# Patient Record
Sex: FEMALE | Race: BLACK OR AFRICAN AMERICAN
[De-identification: names, ages, dates, MRNs, and addresses within clinical notes are randomized per-mention and may not be internally consistent; named-entity substitution may affect disease eponyms.]

---

## 2017-04-10 ENCOUNTER — HOSPITAL ENCOUNTER (OUTPATIENT)
Dept: HOSPITAL 56 - MW.US | Age: 27
End: 2017-04-10
Attending: ADVANCED PRACTICE MIDWIFE
Payer: COMMERCIAL

## 2017-04-10 DIAGNOSIS — Z34.93: Primary | ICD-10-CM

## 2017-04-10 DIAGNOSIS — Z3A.34: ICD-10-CM

## 2017-04-11 NOTE — US
EXAM DATE: 04/10/17



PATIENT'S AGE: 26





Patient: MAGDA ABBOTT



Facility: [place], [place]

Patient ID: [id number]

Site Patient ID: [id number].

Site Accession #: [id number]

: 1990

Study: US OB Pelvis [id number]/10/2017 10:34:38 AM

Ordering Physician: Tamiko Alarcon



Final Report: 

INDICATION:

Check placenta, position and estimated fetal weight.



TECHNIQUE:

Limited transabdominal and transvaginal two-dimensional grayscale ultrasound 
examination.



COMPARISON:

2016.



FINDINGS:

There is a living fetus vertex lie with gestational age of 34 weeks 6 days by 
LMP and 36 weeks 2 days by today`s measurements. EDC based on LMP is 2017.



BPD: 9.1 cm, 36 weeks 6 days

Head circumference: 32.3 cm, 36 weeks 4 days

Abdominal circumference: 30.3 cm, 34 weeks 2 days

Femur length: 7.3 cm, 37 weeks 2 days 



The fetal weight is estimated at 2707 grams, the 67th percentile.



The fetal heart rate is measured at 142 beats per minute and the rhythm appears 
regular. 



The amniotic fluid volume is within normal limits with PAULO of 12 cm.



The placenta is anterior and superior to the cervical os. There is no evidence 
of previa. 



The cervical length is normal at 3.0 cm in the internal os is closed.



IMPRESSION:

1. Living fetus in vertex lie with gestational age of 34 weeks 6 days by LMP 
and 36 weeks 2 days by today`s measurements. EDC based on LMP is 2017.

2. Fetal weight estimated at 2707 grams, the 67th percentile.



Dictated by Dennis Kee MD @ Apr 10 2017 4:32PM

(Electronic Signature)



Report Signed by Proxy and Original Signed Document filed in the

Medical Record.
CATHYD

## 2017-04-19 ENCOUNTER — HOSPITAL ENCOUNTER (OUTPATIENT)
Dept: HOSPITAL 56 - MW.CHOBGYN | Age: 27
Discharge: HOME | End: 2017-04-19
Attending: ADVANCED PRACTICE MIDWIFE
Payer: COMMERCIAL

## 2017-04-19 DIAGNOSIS — Z34.90: Primary | ICD-10-CM

## 2017-05-06 ENCOUNTER — HOSPITAL ENCOUNTER (OUTPATIENT)
Dept: HOSPITAL 56 - MW.OBCHECK | Age: 27
Discharge: HOME | End: 2017-05-06
Attending: OBSTETRICS & GYNECOLOGY
Payer: COMMERCIAL

## 2017-05-06 DIAGNOSIS — Z3A.26: ICD-10-CM

## 2017-05-06 DIAGNOSIS — O36.8121: Primary | ICD-10-CM

## 2017-05-08 ENCOUNTER — HOSPITAL ENCOUNTER (INPATIENT)
Dept: HOSPITAL 56 - MW.OBCHECK | Age: 27
LOS: 1 days | Discharge: HOME | DRG: 560 | End: 2017-05-09
Attending: OBSTETRICS & GYNECOLOGY | Admitting: OBSTETRICS & GYNECOLOGY
Payer: COMMERCIAL

## 2017-05-08 DIAGNOSIS — Z3A.38: ICD-10-CM

## 2017-05-08 PROCEDURE — 0KQM0ZZ REPAIR PERINEUM MUSCLE, OPEN APPROACH: ICD-10-PCS | Performed by: OBSTETRICS & GYNECOLOGY

## 2017-05-08 NOTE — PCM.LDHP
L&D History of Present Illness





- General


Date of Service: 17


Admit Problem/Dx: 


 Patient Status Order with Admit Dx/Problem





17 10:25


Patient Status [ADT] Routine 








 Admission Diagnosis/Problem











Admission Diagnosis/Problem    Pregnant - planned














17 10:31


27 yo  EDC 2017 38 wks today. A+, RI, GBS neg. Active labor 6-7cm. 

Intact. Hx in pregnancy; anemia.


Source of Information: Patient


History Limitations: Reports: No limitations





- History of Present Illness


Improves with: Reports: None


Worsens with: Reports: None


Associated Symptoms: Reports: N





- Related Data


Allergies/Adverse Reactions: 


 Allergies











Allergy/AdvReac Type Severity Reaction Status Date / Time


 


No Known Allergies Allergy   Verified 04/23/15 08:19














H&P Review of Systems





- Review of Systems:


Review Of Systems: See Below


General: Reports: no symptoms


HEENT: Reports: no symptoms


Pulmonary: Reports: No Symptoms


Cardiovascular: Reports: no symptoms


Gastrointestinal: Reports: No symptoms


Genitourinary: Reports: no symptoms


Musculoskeletal: Reports: no symptoms


Skin: Reports: no symptoms


Psychiatric: Reports: no symptoms


Neurological: Reports: No Symptoms


Hematologic/Lymphatic: Reports: no symptoms


Immunologic: Reports: no symptoms





L&D Exam





- Exam


Exam: See Below





- Vital Signs


Weight: 70.307 kg





- OB Specific


Contraction Intensity: Strong


Fetal movement: active


Fetal heart tones: present


Fetal heart tones per min: 120


Fetal Heart Rate (FHR) Variability: Moderate (6-25 bmp)


Birth Presentation: Vertex





- Garcia Score


Garcia Score Cervix Position: Anterior


Garcia Score Consistency: Soft


Garcia Score Effacement: >80%


Garcia Score Dilation: > 5 cm


Garcia Score Infant's Station: -1 ,0


Garcia Score Total: 12





- Exam


General: alert, oriented, cooperative


HEENT: Hearing intact


Lungs: Normal respiratory effort


Abdomen: soft (gravid)


Rectal Exam: Deferred


Back Exam: full range of motion


Extremities: normal inspection


Skin: warm, dry, intact


Neurological: cranial nerves intact


Psychiatric: alert, normal affect, normal mood





- Problem List


(1) Supervision of normal IUP (intrauterine pregnancy) in multigravida


SNOMED Code(s): 715700494, 152297498, 972292505


   ICD Code: Z34.80 - ENCOUNTER FOR SUPRVSN OF NORMAL PREGNANCY, UNSP TRIMESTER

   Status: Acute   Current Visit: Yes   


Qualifiers: 


   Trimester: third trimester   Qualified Code(s): Z34.83 - Encounter for 

supervision of other normal pregnancy, third trimester   


Problem List Initiated/Reviewed/Updated: Yes


Orders Last 24hrs: 


 Active Orders 24 hr











 Category Date Time Status


 


 Patient Status [ADT] Routine ADT  17 10:25 Active


 


 Fetal Heart Tones [RC] CONTINUOUS Care  17 10:25 Active


 


 Fetal Non Stress Test [RC] PER UNIT ROUTINE Care  17 10:25 Active


 


 May Shower [RC] ASDIRECTED Care  17 10:25 Active


 


 Notify Provider [RC] PRN Care  17 10:25 Active


 


 Up ad Mary [RC] ASDIRECTED Care  17 10:25 Active


 


 Vaginal Exam [RC] PRN Care  17 10:25 Active


 


 Vital Signs [RC] PER UNIT ROUTINE Care  17 10:25 Active


 


 CBC W/O DIFF,HEMOGRAM [HEME] Routine Lab  17 10:25 Ordered


 


 TYPE AND SCREEN [BBK] Routine Lab  17 10:25 Ordered


 


 Butorphanol [Stadol] Med  17 10:25 Active





 1 mg IVPUSH Q1H PRN   


 


 Carboprost Tromethamine [Hemabate DS] Med  17 10:25 Active





 250 mcg IM ASDIRECTED PRN   


 


 Lactated Ringers [Ringers, Lactated] 1,000 ml Med  17 10:30 Active





 IV ASDIRECTED   


 


 Lidocaine 1% [Xylocaine 1%] Med  17 10:25 Active





 50 ml INJECT .ONCE PRN   


 


 Methylergonovine [Methergine] Med  17 10:25 Active





 0.2 mg IM ASDIRECTED PRN   


 


 Misoprostol [Cytotec] Med  17 10:25 Active





 200 mcg PO .ONCE PRN   


 


 Nalbuphine [Nubain] Med  17 10:25 Active





 10 mg IVPUSH Q1H PRN   


 


 Oxytocin/Lactated Ringers [Pitocin in LR 30 Units/500 Med  17 10:30 

Active





 ML]   





 30 unit in 500 ml IV TITRATE   


 


 Sodium Chloride 0.9% [Saline Flush] Med  17 10:25 Active





 10 ml FLUSH ASDIRECTED PRN   


 


 Sodium Chloride 0.9% [Saline Flush] Med  17 10:25 Active





 2.5 ml FLUSH ASDIRECTED PRN   


 


 Water For Irrigation,Sterile [Sterile Water for Med  17 10:25 Active





 Irrigation]   





 1,000 ml IRR ASDIRECTED PRN   


 


 Fetal Scalp Electrode [WOMSER] Per Unit Routine Oth  17 10:25 Ordered


 


 Peripheral IV Insertion Adult [OM.PC] Routine Oth  17 10:25 Ordered


 


 Resuscitation Status Routine Resus Stat  17 10:25 Ordered








 Medication Orders





Butorphanol Tartrate (Stadol)  1 mg IVPUSH Q1H PRN


   PRN Reason: Pain


Carboprost Tromethamine (Hemabate Ds)  250 mcg IM ASDIRECTED PRN


   PRN Reason: Post Partum Hemorrhage


Lactated Ringer's (Ringers, Lactated)  1,000 mls @ 150 mls/hr IV ASDIRECTED UZMA


Oxytocin/Lactated Ringer's (Pitocin In Lr 30 Units/500 Ml)  30 unit in 500 mls 

@ 999 mls/hr IV TITRATE UZMA


   Stop: 17 11:01


Lidocaine HCl (Xylocaine 1%)  50 ml INJECT .ONCE PRN


   PRN Reason: Laceration repair


Methylergonovine Maleate (Methergine)  0.2 mg IM ASDIRECTED PRN


   PRN Reason: Post Partum Hemorrhage


Misoprostol (Cytotec)  200 mcg PO .ONCE PRN


   PRN Reason: Post Partum Hemorrhage


Nalbuphine HCl (Nubain)  10 mg IVPUSH Q1H PRN


   PRN Reason: Pain (severe 7-10)


   Stop: 17 12:26


Sodium Chloride (Saline Flush)  10 ml FLUSH ASDIRECTED PRN


   PRN Reason: Keep Vein Open


Sodium Chloride (Saline Flush)  2.5 ml FLUSH ASDIRECTED PRN


   PRN Reason: Keep Vein Open


Sterile Water (Sterile Water For Irrigation)  1,000 ml IRR ASDIRECTED PRN


   PRN Reason: delivery








Assessment/Plan Comment:: 





Labor


A: 27 yo  EDC 2017 38 wks today. A+, RI, GBS neg. Active labor 6-

7cm. Intact. Hx in pregnancy; anemia.


P: Admit to L&D, epidural prn. Anticipate

## 2017-05-08 NOTE — PCM.PREANE
Preanesthetic Assessment





- Anesthesia/Transfusion/Family Hx


Anesthesia History: Prior Anesthesia Without Reaction





- Review of Systems


General: No Symptoms


Pulmonary: No Symptoms


Cardiovascular: No Symptoms


Gastrointestinal: No symptoms


Neurological: No Symptoms


Other: Reports: None





- Physical Assessment


Height: 5 ft 6 in


Weight: 70.307 kg


ASA Class: 2


Mental Status: Alert & Oriented x3


Airway Class: Mallampati = 2


Dentition: Reports: Normal Dentition


Thyro-Mental Finger Breadths: 3


Mouth Opening Finger Breadths: 3


ROM/Head Extension: Full


Lungs: Clear to auscultation, Normal respiratory effort


Cardiovascular: Regular Rate, Regular Rhythm





- Lab


Values: 





 Laboratory Last Values











WBC  8.68 K/uL (4.0-11.0)   17  10:37    


 


RBC  4.49 M/uL (4.30-5.90)   17  10:37    


 


Hgb  12.1 g/dL (12.0-16.0)   17  10:37    


 


Hct  36.3 % (36.0-46.0)   17  10:37    


 


MCV  80.8 fL (80.0-98.0)   17  10:37    


 


MCH  26.9 pg (27.0-32.0)  L  17  10:37    


 


MCHC  33.3 g/dL (31.0-37.0)   17  10:37    


 


RDW Std Deviation  62.3 fl (28.0-62.0)  H  17  10:37    


 


RDW Coeff of Guille  21 % (11.0-15.0)  H  17  10:37    


 


Plt Count  168 K/uL (150-400)   17  10:37    


 


MPV  10.60 fL (7.40-12.00)   17  10:37    


 


Nucleated RBC %  0.0 /100WBC  17  10:37    


 


Nucleated RBCs #  0 K/uL  17  10:37    














- Allergies


Allergies/Adverse Reactions: 


 Allergies











Allergy/AdvReac Type Severity Reaction Status Date / Time


 


No Known Allergies Allergy   Verified 04/23/15 08:19














- Acknowledgements


Anesthesia Type Planned: Epidural


Pt an Appropriate Candidate for the Planned Anesthesia: Yes


Alternatives and Risks of Anesthesia Discussed w Pt/Guardian: Yes


Pt/Guardian Understands and Agrees with Anesthesia Plan: Yes





PreAnesthesia Questionnaire


HEENT History: Reports: None


Cardiovascular History: Reports: None


Respiratory History: Reports: None


Gastrointestinal History: Reports: GERD


Genitourinary History: Reports: None


OB/GYN History: Reports: Pregnancy


: 2


Para: 1


LMP (Approximate): Pregnant


Musculoskeletal History: Reports: None


Neurological History: Reports: None


Psychiatric History: Reports: None


Endocrine/Metabolic History: Reports: None


Hematologic History: Reports: Anemia


Immunologic History: Reports: None


Oncologic (Cancer) History: Reports: None


Dermatologic History: Reports: None





- Infectious Disease History


Infectious Disease History: Reports: None





- CURRENT (IN HOUSE) MEDS


Current Meds: 





 Current Medications





Butorphanol Tartrate (Stadol)  1 mg IVPUSH Q1H PRN


   PRN Reason: Pain


Carboprost Tromethamine (Hemabate Ds)  250 mcg IM ASDIRECTED PRN


   PRN Reason: Post Partum Hemorrhage


Lactated Ringer's (Ringers, Lactated)  1,000 mls @ 150 mls/hr IV ASDIRECTED UZMA


Lidocaine HCl (Xylocaine 1%)  50 ml INJECT .ONCE PRN


   PRN Reason: Laceration repair


Methylergonovine Maleate (Methergine)  0.2 mg IM ASDIRECTED PRN


   PRN Reason: Post Partum Hemorrhage


Misoprostol (Cytotec)  200 mcg PO .ONCE PRN


   PRN Reason: Post Partum Hemorrhage


Nalbuphine HCl (Nubain)  10 mg IVPUSH Q1H PRN


   PRN Reason: Pain (severe 7-10)


   Stop: 17 12:26


Sodium Chloride (Saline Flush)  10 ml FLUSH ASDIRECTED PRN


   PRN Reason: Keep Vein Open


Sodium Chloride (Saline Flush)  2.5 ml FLUSH ASDIRECTED PRN


   PRN Reason: Keep Vein Open


Sterile Water (Sterile Water For Irrigation)  1,000 ml IRR ASDIRECTED PRN


   PRN Reason: delivery





Discontinued Medications





Oxytocin/Lactated Ringer's (Pitocin In Lr 30 Units/500 Ml)  30 unit in 500 mls 

@ 999 mls/hr IV TITRATE UZMA


   Stop: 17 11:01

## 2017-05-09 VITALS — SYSTOLIC BLOOD PRESSURE: 116 MMHG | DIASTOLIC BLOOD PRESSURE: 70 MMHG

## 2017-05-09 NOTE — PCM.DCSUM1
**Discharge Summary





- Hospital Course


Free Text/Narrative:: 





Discharge home with infant girl. Follow up 6 weeks or sooner if needed.





- Discharge Data


Discharge Date: 05/09/17


Discharge Disposition: Home, Self-Care 01


Condition: Good





- Discharge Diagnosis/Problem(s)


(1) Supervision of normal IUP (intrauterine pregnancy) in multigravida


SNOMED Code(s): 998740257, 456522446, 116171542


   ICD Code: Z34.80 - ENCOUNTER FOR SUPRVSN OF NORMAL PREGNANCY, UNSP TRIMESTER

   Status: Acute   Current Visit: Yes   


Qualifiers: 


   Trimester: third trimester   Qualified Code(s): Z34.83 - Encounter for 

supervision of other normal pregnancy, third trimester   





- Patient Instructions


Diet: Usual Diet as Tolerated


Activity: As Tolerated, Rest and Relax Today


Driving: Do Not Drive (for a few days)


Showering/Bathing: May Shower


Notify Provider of: Fever, Increased Pain, Swelling and Redness, Nausea and/or 

Vomiting





- Discharge Plan


Referrals: 


[facility] [Outside]


Agnes Morrison CNM [Mid-Wife] - 06/19/17 9:30 am





- Discharge Summary/Plan Comment


Discharge Summary/Plan Comment: 





Discharge home with infant girl. Follow up 6 weeks or sooner if needed.





- General Info


Date of Service: 05/09/17


Functional Status: Reports: pain controlled, tolerating diet, ambulating, 

urinating





- Review of Systems


General: Reports: No Symptoms


HEENT: Reports: no symptoms


Pulmonary: Reports: no symptoms


Cardiovascular: Reports: No Symptoms


Gastrointestinal: Reports: No symptoms


Genitourinary: Reports: no symptoms


Musculoskeletal: Reports: no symptoms


Skin: Reports: no symptoms


Neurological: Reports: No Symptoms


Psychiatric: Reports: no symptoms





- Patient Data


Vitals - Most Recent: 


 Last Vital Signs











Temp  36.8 C   05/09/17 04:00


 


Pulse  70   05/09/17 04:00


 


Resp  12   05/09/17 04:00


 


BP  115/63   05/09/17 04:00


 


Pulse Ox  98   05/09/17 04:00











Weight - Most Recent: 83.915 kg


I&O - Last 24 hours: 


 Intake & Output











 05/08/17 05/09/17 05/09/17





 22:59 06:59 14:59


 


Intake Total 500  


 


Balance 500  











Lab Results - Last 24 hrs: 


 Laboratory Results - last 24 hr











  05/08/17 05/08/17 Range/Units





  10:37 10:37 


 


WBC  8.68   (4.0-11.0)  K/uL


 


RBC  4.49   (4.30-5.90)  M/uL


 


Hgb  12.1   (12.0-16.0)  g/dL


 


Hct  36.3   (36.0-46.0)  %


 


MCV  80.8   (80.0-98.0)  fL


 


MCH  26.9 L   (27.0-32.0)  pg


 


MCHC  33.3   (31.0-37.0)  g/dL


 


RDW Std Deviation  62.3 H   (28.0-62.0)  fl


 


RDW Coeff of Guille  21 H   (11.0-15.0)  %


 


Plt Count  168   (150-400)  K/uL


 


MPV  10.60   (7.40-12.00)  fL


 


Nucleated RBC %  0.0   /100WBC


 


Nucleated RBCs #  0   K/uL


 


Blood Type   A POSITIVE  


 


Antibody Screen   NEGATIVE  











Med Orders - Current: 


 Current Medications





Acetaminophen (Tylenol Extra Strength)  500 mg PO Q4H PRN


   PRN Reason: Pain


Acetaminophen (Tylenol Extra Strength)  1,000 mg PO Q4H PRN


   PRN Reason: Pain


   Last Admin: 05/09/17 02:35 Dose:  1,000 mg


Benzocaine/Menthol (Dermoplast Pain Relief 20%-0.5% Spray)  78 gm TOP 

ASDIRECTED PRN


   PRN Reason: Perineal Comfort Measure


   Last Admin: 05/08/17 20:36 Dose:  1 canister


Bisacodyl (Dulcolax)  10 mg RECTAL .ONCE PRN


   PRN Reason: Constipation


Docusate Sodium (Colace)  100 mg PO BID PRN


   PRN Reason: Constipation


Emollient Ointment (Lansinoh Hpa)  0 gm TOP ASDIRECTED PRN


   PRN Reason: Sore Nipples


   Last Admin: 05/08/17 20:35 Dose:  1 tube


Ibuprofen (Motrin)  400 mg PO Q4H PRN


   PRN Reason: Pain


Ibuprofen (Motrin)  800 mg PO Q6H PRN


   PRN Reason: Pain


   Last Admin: 05/08/17 20:32 Dose:  800 mg


Oxycodone HCl (Oxycodone)  5 mg PO Q2H PRN


   PRN Reason: Pain


   Last Admin: 05/08/17 23:06 Dose:  5 mg


Witch Hazel (Tucks)  1 pad TOP ASDIRECTED PRN


   PRN Reason: comfort care


   Last Admin: 05/08/17 20:36 Dose:  1 tub





Discontinued Medications





Butorphanol Tartrate (Stadol)  1 mg IVPUSH Q1H PRN


   PRN Reason: Pain


Carboprost Tromethamine (Hemabate Ds)  250 mcg IM ASDIRECTED PRN


   PRN Reason: Post Partum Hemorrhage


Fentanyl (Sublimaze) Confirm Administered Dose 100 mcg .ROUTE .STK-MED ONE


   Stop: 05/08/17 11:08


Lactated Ringer's (Ringers, Lactated)  1,000 mls @ 150 mls/hr IV ASDIRECTED [facility]


   Last Admin: 05/08/17 12:50 Dose:  999 mls/hr


Oxytocin/Lactated Ringer's (Pitocin In Lr 30 Units/500 Ml)  30 unit in 500 mls 

@ 999 mls/hr IV TITRATE [facility]


   Stop: 05/08/17 11:01


   Last Admin: 05/08/17 14:47 Dose:  999 mls/hr


Ropivacaine (Naropin 0.2%) Confirm Administered Dose 100 mls @ as directed 

.ROUTE .STK-MED ONE


   Stop: 05/08/17 11:08


Lidocaine HCl (Xylocaine 1%)  50 ml INJECT .ONCE PRN


   PRN Reason: Laceration repair


Methylergonovine Maleate (Methergine)  0.2 mg IM ASDIRECTED PRN


   PRN Reason: Post Partum Hemorrhage


Misoprostol (Cytotec)  200 mcg PO .ONCE PRN


   PRN Reason: Post Partum Hemorrhage


Nalbuphine HCl (Nubain)  10 mg IVPUSH Q1H PRN


   PRN Reason: Pain (severe 7-10)


   Stop: 05/08/17 12:26


Sodium Chloride (Saline Flush)  10 ml FLUSH ASDIRECTED PRN


   PRN Reason: Keep Vein Open


Sodium Chloride (Saline Flush)  2.5 ml FLUSH ASDIRECTED PRN


   PRN Reason: Keep Vein Open


Sterile Water (Sterile Water For Irrigation)  1,000 ml IRR ASDIRECTED PRN


   PRN Reason: delivery











- Exam


General: Reports: alert, oriented, cooperative, no acute distress


Lungs: Reports: Normal respiratory effort


Abdomen: Reports: soft, no tenderness, no distension


 (Female) Exam: Vaginal bleeding


Rectal (Female) Exam: Deferred


Back Exam: Reports: full range of motion


Extremities: Reports: no edema, normal pulses


Skin: Reports: warm, dry, intact


Wound/Incisions: Reports: healing well


Neurological: Reports: no new focal deficit


Psy/Mental Status: Reports: alert, normal affect, normal mood





*Q Meaningful Use (DIS)





- VTE *Q


VTE Criteria *Q: 








- Stroke *Q


Stroke Criteria *Q: 








- AMI *Q


AMI Criteria *Q:

## 2019-04-03 LAB
CHLORIDE SERPL-SCNC: 103 MMOL/L (ref 98–107)
SODIUM SERPL-SCNC: 139 MMOL/L (ref 136–145)

## 2019-04-04 ENCOUNTER — HOSPITAL ENCOUNTER (OUTPATIENT)
Dept: HOSPITAL 56 - MW.SDS | Age: 29
Discharge: HOME | End: 2019-04-04
Attending: OBSTETRICS & GYNECOLOGY
Payer: COMMERCIAL

## 2019-04-04 VITALS — SYSTOLIC BLOOD PRESSURE: 118 MMHG | DIASTOLIC BLOOD PRESSURE: 85 MMHG

## 2019-04-04 DIAGNOSIS — Z79.899: ICD-10-CM

## 2019-04-04 DIAGNOSIS — N72: ICD-10-CM

## 2019-04-04 DIAGNOSIS — N87.1: Primary | ICD-10-CM

## 2019-04-04 PROCEDURE — 57522 CONIZATION OF CERVIX: CPT

## 2019-04-04 PROCEDURE — 84703 CHORIONIC GONADOTROPIN ASSAY: CPT

## 2019-04-04 PROCEDURE — 86901 BLOOD TYPING SEROLOGIC RH(D): CPT

## 2019-04-04 PROCEDURE — 86850 RBC ANTIBODY SCREEN: CPT

## 2019-04-04 PROCEDURE — 85027 COMPLETE CBC AUTOMATED: CPT

## 2019-04-04 PROCEDURE — 36415 COLL VENOUS BLD VENIPUNCTURE: CPT

## 2019-04-04 PROCEDURE — 86900 BLOOD TYPING SEROLOGIC ABO: CPT

## 2019-04-04 PROCEDURE — 80048 BASIC METABOLIC PNL TOTAL CA: CPT

## 2019-04-04 NOTE — PCM.DCSUM1
**Discharge Summary





- Hospital Course


Diagnosis: Stroke: No





- Discharge Data


Discharge Date: 04/04/19


Discharge Disposition: Home, Self-Care 01


Condition: Good





- Patient Summary/Data


Operative Procedure(s) Performed: Leep





- Patient Instructions


Diet: Usual Diet as Tolerated


Activity: As Tolerated


Driving: Do Not Drive


Showering/Bathing: May Shower


Notify Provider of: Fever, Nausea and/or Vomiting





- Discharge Plan


Home Medications: 


 Home Meds





Ibuprofen 800 mg PO TID PRN 04/01/19 [History]











- Discharge Summary/Plan Comment


DC Time >30 min.: Yes





- General Info


Date of Service: 04/04/19


Functional Status: Reports: Pain Controlled





- Review of Systems


General: Reports: No Symptoms


HEENT: Reports: No Symptoms


Pulmonary: Reports: No Symptoms


Cardiovascular: Reports: No Symptoms


Gastrointestinal: Reports: No Symptoms


Genitourinary: Reports: No Symptoms


Musculoskeletal: Reports: No Symptoms


Skin: Reports: No Symptoms


Neurological: Reports: No Symptoms


Psychiatric: Reports: No Symptoms





- Patient Data


Vitals - Most Recent: 


 Last Vital Signs











Temp  36.1 C   04/04/19 09:20


 


Pulse  79   04/04/19 09:20


 


Resp  16   04/04/19 09:20


 


BP  110/74   04/04/19 09:20


 


Pulse Ox  98   04/04/19 09:20











Weight - Most Recent: 77.564 kg


Lab Results - Last 24 hrs: 


 Laboratory Results - last 24 hr











  04/03/19 04/03/19 04/03/19 Range/Units





  11:40 11:40 11:40 


 


WBC  7.11    (4.0-11.0)  K/uL


 


RBC  4.98    (4.30-5.90)  M/uL


 


Hgb  14.1    (12.0-16.0)  g/dL


 


Hct  41.7    (36.0-46.0)  %


 


MCV  83.7    (80.0-98.0)  fL


 


MCH  28.3    (27.0-32.0)  pg


 


MCHC  33.8    (31.0-37.0)  g/dL


 


RDW Std Deviation  46.3    (28.0-62.0)  fl


 


RDW Coeff of Guille  15    (11.0-15.0)  %


 


Plt Count  260    (150-400)  K/uL


 


MPV  10.10    (7.40-12.00)  fL


 


Nucleated RBC %  0.0    /100WBC


 


Nucleated RBCs #  0    K/uL


 


Sodium   139   (136-145)  mmol/L


 


Potassium   4.7   (3.5-5.1)  mmol/L


 


Chloride   103   ()  mmol/L


 


Carbon Dioxide   25.8   (21.0-32.0)  mmol/L


 


BUN   13   (7.0-18.0)  mg/dL


 


Creatinine   0.9   (0.6-1.0)  mg/dL


 


Est Cr Clr Drug Dosing   85.56   mL/min


 


Estimated GFR (MDRD)   > 60.0   ml/min


 


Glucose   90   ()  mg/dL


 


Calcium   9.8   (8.5-10.1)  mg/dL


 


HCG, Qual    NEGATIVE  (NEG)  


 


Blood Type     


 


Antibody Screen     














  04/03/19 Range/Units





  11:40 


 


WBC   (4.0-11.0)  K/uL


 


RBC   (4.30-5.90)  M/uL


 


Hgb   (12.0-16.0)  g/dL


 


Hct   (36.0-46.0)  %


 


MCV   (80.0-98.0)  fL


 


MCH   (27.0-32.0)  pg


 


MCHC   (31.0-37.0)  g/dL


 


RDW Std Deviation   (28.0-62.0)  fl


 


RDW Coeff of Guille   (11.0-15.0)  %


 


Plt Count   (150-400)  K/uL


 


MPV   (7.40-12.00)  fL


 


Nucleated RBC %   /100WBC


 


Nucleated RBCs #   K/uL


 


Sodium   (136-145)  mmol/L


 


Potassium   (3.5-5.1)  mmol/L


 


Chloride   ()  mmol/L


 


Carbon Dioxide   (21.0-32.0)  mmol/L


 


BUN   (7.0-18.0)  mg/dL


 


Creatinine   (0.6-1.0)  mg/dL


 


Est Cr Clr Drug Dosing   mL/min


 


Estimated GFR (MDRD)   ml/min


 


Glucose   ()  mg/dL


 


Calcium   (8.5-10.1)  mg/dL


 


HCG, Qual   (NEG)  


 


Blood Type  A POSITIVE  


 


Antibody Screen  NEGATIVE  











Med Orders - Current: 


 Current Medications





Lactated Ringer's (Ringers, Lactated)  1,000 mls @ 500 mls/hr IV BOLUS UZMA


Ketorolac Tromethamine (Toradol)  30 mg IVPUSH ONETIME ONE


   Stop: 04/04/19 10:04


Ketorolac Tromethamine (Toradol)  30 mg IVPUSH Q6H PRN


   PRN Reason: Pain (severe 7-10)


   Stop: 04/09/19 10:04


Morphine Sulfate (Morphine)  4 mg IVPUSH Q2H PRN


   PRN Reason: Pain (severe 7-10)


Ondansetron HCl (Zofran)  4 mg IVPUSH Q6H PRN


   PRN Reason: Nausea/Vomiting


Oxycodone/Acetaminophen (Percocet 325-5 Mg)  1 tab PO Q4H PRN


   PRN Reason: Pain (moderate 4-6)


Oxycodone/Acetaminophen (Percocet 325-5 Mg)  2 tab PO Q4H PRN


   PRN Reason: Pain (moderate 4-6)


Promethazine HCl (Phenergan)  25 mg IM Q6H PRN


   PRN Reason: Nausea/Vomiting


Sodium Chloride (Saline Flush)  10 ml FLUSH ASDIRECTED PRN


   PRN Reason: Keep Vein Open


Sodium Chloride (Saline Flush)  2.5 ml FLUSH ASDIRECTED PRN


   PRN Reason: Keep Vein Open


Sodium Chloride (Normal Saline)  10 ml IV ASDIRECTED PRN


   PRN Reason: IV Use





Discontinued Medications





Fentanyl (Sublimaze) Confirm Administered Dose 100 mcg .ROUTE .STK-MED ONE


   Stop: 04/04/19 07:29


Glycopyrrolate (Robinul) Confirm Administered Dose 0.4 mg .ROUTE .STK-MED ONE


   Stop: 04/04/19 10:01


Lidocaine (Xylocaine-Mpf 2%) Confirm Administered Dose 5 ml .ROUTE .STK-MED ONE


   Stop: 04/04/19 07:29


Lidocaine HCl (Xylocaine 1%) Confirm Administered Dose 20 ml .ROUTE .STK-MED ONE


   Stop: 04/04/19 08:11


Midazolam HCl (Versed 1 Mg/Ml) Confirm Administered Dose 2 mg .ROUTE .STK-MED 

ONE


   Stop: 04/04/19 07:29


Ondansetron HCl (Zofran) Confirm Administered Dose 4 mg .ROUTE .STK-MED ONE


   Stop: 04/04/19 09:49


Ondansetron HCl (Zofran) Confirm Administered Dose 4 mg .ROUTE .STK-MED ONE


   Stop: 04/04/19 09:49


Propofol (Diprivan  20 Ml) Confirm Administered Dose 200 mg .ROUTE .STK-MED ONE


   Stop: 04/04/19 07:30


Rocuronium Bromide (Zemuron) Confirm Administered Dose 100 mg .ROUTE .STK-MED 

ONE


   Stop: 04/04/19 07:31











- Exam


General: Reports: Alert, Oriented


HEENT: Reports: Pupils Equal, Pupils Reactive, EOMI, Mucous Membr. Moist/Pink


Neck: Reports: Supple


Lungs: Reports: Clear to Auscultation, Normal Respiratory Effort


Cardiovascular: Reports: Regular Rate, Regular Rhythm


GI/Abdominal Exam: Normal Bowel Sounds, Soft, Non-Tender, No Organomegaly, No 

Distention, No Abnormal Bruit, No Mass, Pelvis Stable


 (Female) Exam: Normal External Exam, Normal Speculum Exam, Normal Bimanual 

Exam


Rectal (Female) Exam: Normal Exam, Normal Rectal Tone


Back Exam: Reports: Normal Inspection, Full Range of Motion


Extremities: Normal Inspection, Normal Range of Motion, Non-Tender, No Pedal 

Edema, Normal Capillary Refill


Skin: Reports: Warm, Dry, Intact


Wound/Incisions: Reports: Healing Well


Neurological: Reports: No New Focal Deficit


Psy/Mental Status: Reports: Alert, Normal Affect, Normal Mood

## 2019-04-04 NOTE — OR
SURGEON:

Chip Akhtar MD

 

DATE OF PROCEDURE:  04/04/2019

 

PREOPERATIVE DIAGNOSIS:

Abnormal Pap smear with a high-grade abnormal virus.

 

POSTOPERATIVE DIAGNOSIS:

Abnormal Pap smear with a high-grade abnormal virus.

 

PROCEDURE PERFORMED:

LEEP conization of the cervix.

 

ASSISTANT:

OR tech.

 

ANESTHESIA:

LMA general.

 

ESTIMATED BLOOD LOSS:

50 mL.

 

COMPLICATIONS:

None.

 

FINDING:

Normal uterus, tubes, and ovaries.

 

INDICATION FOR SURGERY:

This patient is a 28-year-old, she has had a persistent abnormal Pap smear with

a high-grade virus 16 and 18.  The patient is admitted with intention of doing

diagnostic and therapeutic cone.

 

PROCEDURE DETAIL:

The patient was brought to the OR, properly identified.  After adequate level of

anesthesia, patient was placed in lithotomy position, prepped and draped in

sterile fashion as usual.  A short weighted speculum placed in vagina, and then,

the cervix was grabbed on 2 sides at 3 and 9 o'clock with Allis clamp, and then,

using the loop excision, shallow cone is performed without any problem and sent

for histopathology, and then, the base of the cone was cauterized with a ball

electrocautery until the bleeding stopped.  Once __________ procedure is

finished and instrument and hardware retrieved from the vagina, and instrument

and hardware count was correct.  The patient tolerated the procedure well, went

to recovery room in stable and general condition.

 

 

DANETTE / JESUS

DD:  04/04/2019 10:11:31

DT:  04/04/2019 16:44:10

Job #:  [id number]

## 2019-04-04 NOTE — PCM.PREANE
Preanesthetic Assessment





- Anesthesia/Transfusion/Family Hx


Anesthesia History: No Prior Anesthesia


Family History of Anesthesia Reaction: No


Transfusion History: No Prior Transfusion(s)





- Review of Systems


General: No Symptoms


Pulmonary: No Symptoms


Cardiovascular: No Symptoms


Gastrointestinal: No Symptoms


Neurological: No Symptoms


Other: Reports: None





- Physical Assessment


NPO Status Date: 19


Height: 5 ft 6 in


Weight: 77.564 kg


ASA Class: 2


Mental Status: Alert & Oriented x3


Airway Class: Mallampati = 1


Dentition: Reports: Normal Dentition


ROM/Head Extension: Full


Lungs: Clear to Auscultation, Normal Respiratory Effort


Cardiovascular: Regular Rate, Regular Rhythm





- Lab


Values: 





 Laboratory Last Values











WBC  7.11 K/uL (4.0-11.0)   19  11:40    


 


RBC  4.98 M/uL (4.30-5.90)   19  11:40    


 


Hgb  14.1 g/dL (12.0-16.0)   19  11:40    


 


Hct  41.7 % (36.0-46.0)   19  11:40    


 


MCV  83.7 fL (80.0-98.0)   19  11:40    


 


MCH  28.3 pg (27.0-32.0)   19  11:40    


 


MCHC  33.8 g/dL (31.0-37.0)   19  11:40    


 


RDW Std Deviation  46.3 fl (28.0-62.0)   19  11:40    


 


RDW Coeff of Guille  15 % (11.0-15.0)   19  11:40    


 


Plt Count  260 K/uL (150-400)   19  11:40    


 


MPV  10.10 fL (7.40-12.00)   19  11:40    


 


Nucleated RBC %  0.0 /100WBC  19  11:40    


 


Nucleated RBCs #  0 K/uL  19  11:40    


 


Sodium  139 mmol/L (136-145)   19  11:40    


 


Potassium  4.7 mmol/L (3.5-5.1)   19  11:40    


 


Chloride  103 mmol/L ()   19  11:40    


 


Carbon Dioxide  25.8 mmol/L (21.0-32.0)   19  11:40    


 


BUN  13 mg/dL (7.0-18.0)   19  11:40    


 


Creatinine  0.9 mg/dL (0.6-1.0)   19  11:40    


 


Est Cr Clr Drug Dosing  85.56 mL/min  19  11:40    


 


Estimated GFR (MDRD)  > 60.0 ml/min  19  11:40    


 


Glucose  90 mg/dL ()   19  11:40    


 


Calcium  9.8 mg/dL (8.5-10.1)   19  11:40    


 


HCG, Qual  NEGATIVE  (NEG)   19  11:40    


 


Blood Type  A POSITIVE   19  11:40    


 


Antibody Screen  NEGATIVE   19  11:40    














- Allergies


Allergies/Adverse Reactions: 


 Allergies











Allergy/AdvReac Type Severity Reaction Status Date / Time


 


No Known Allergies Allergy   Verified 19 09:01














- Blood


Blood Available: No





- Anesthesia Plan


Pre-Op Medication Ordered: None





- Acknowledgements


Anesthesia Type Planned: General Anesthesia


Pt an Appropriate Candidate for the Planned Anesthesia: Yes


Alternatives and Risks of Anesthesia Discussed w Pt/Guardian: Yes


Pt/Guardian Understands and Agrees with Anesthesia Plan: Yes





PreAnesthesia Questionnaire





- Past Health History


Medical/Surgical History: Denies Medical/Surgical History


HEENT History: Reports: Other (See Below)


Other HEENT History: wears glasses


Cardiovascular History: Reports: Other (See Below)


Other Cardiovascular History: HTN after child birth, "good now"


Respiratory History: Reports: None


Gastrointestinal History: Reports: None


Genitourinary History: Reports: None


OB/GYN History: Reports: Pregnancy


Other OB/BYN History: currently breast feeding,  in Dec, 2018


Musculoskeletal History: Reports: None


Neurological History: Reports: None


Psychiatric History: Reports: None


Endocrine/Metabolic History: Reports: None


Hematologic History: Reports: None


Immunologic History: Reports: None


Oncologic (Cancer) History: Reports: None


Dermatologic History: Reports: None





- Infectious Disease History


Infectious Disease History: Reports: None





- Past Surgical History


Head Surgeries/Procedures: Reports: None


HEENT Surgical History: Reports: None


Cardiovascular Surgical History: Reports: None


Respiratory Surgical History: Reports: None


GI Surgical History: Reports: None


Female  Surgical History: Reports: None


Endocrine Surgical History: Reports: None


Neurological Surgical History: Reports: None


Musculoskeletal Surgical History: Reports: None


Dermatological Surgical History: Reports: None





- SUBSTANCE USE


Smoking Status *Q: Never Smoker


Recreational Drug Use History: No





- HOME MEDS


Home Medications: 


 Home Meds





Ibuprofen 800 mg PO TID PRN 19 [History]











- CURRENT (IN HOUSE) MEDS


Current Meds: 





 Current Medications





Lactated Ringer's (Ringers, Lactated)  1,000 mls @ 500 mls/hr IV BOLUS UZMA


Sodium Chloride (Saline Flush)  10 ml FLUSH ASDIRECTED PRN


   PRN Reason: Keep Vein Open


Sodium Chloride (Saline Flush)  2.5 ml FLUSH ASDIRECTED PRN


   PRN Reason: Keep Vein Open


Sodium Chloride (Normal Saline)  10 ml IV ASDIRECTED PRN


   PRN Reason: IV Use





Discontinued Medications





Fentanyl (Sublimaze) Confirm Administered Dose 100 mcg .ROUTE .STK-MED ONE


   Stop: 19 07:29


Lidocaine (Xylocaine-Mpf 2%) Confirm Administered Dose 5 ml .ROUTE .STK-MED ONE


   Stop: 19 07:29


Lidocaine HCl (Xylocaine 1%) Confirm Administered Dose 20 ml .ROUTE .STK-MED ONE


   Stop: 19 08:11


Midazolam HCl (Versed 1 Mg/Ml) Confirm Administered Dose 2 mg .ROUTE .STK-MED 

ONE


   Stop: 19 07:29


Propofol (Diprivan  20 Ml) Confirm Administered Dose 200 mg .ROUTE .STK-MED ONE


   Stop: 19 07:30


Rocuronium Bromide (Zemuron) Confirm Administered Dose 100 mg .ROUTE .STK-MED 

ONE


   Stop: 19 07:31

## 2019-04-04 NOTE — PCM.OPNOTE
- General Post-Op/Procedure Note


Date of Surgery/Procedure: 04/04/19


Operative Procedure(s): Leep


Post-Op Diagnosis: Same


Anesthesia Technique: General LMA


Primary Surgeon: Chip Akhtar


EBL in mLs: 50


Complications: None


Condition: Good

## 2020-06-19 ENCOUNTER — HOSPITAL ENCOUNTER (INPATIENT)
Dept: HOSPITAL 56 - MW.OBCHECK | Age: 30
LOS: 2 days | Discharge: HOME | DRG: 540 | End: 2020-06-21
Attending: OBSTETRICS & GYNECOLOGY | Admitting: OBSTETRICS & GYNECOLOGY
Payer: COMMERCIAL

## 2020-06-19 DIAGNOSIS — Z20.828: ICD-10-CM

## 2020-06-19 DIAGNOSIS — Z3A.38: ICD-10-CM

## 2020-06-19 PROCEDURE — U0002 COVID-19 LAB TEST NON-CDC: HCPCS

## 2020-06-19 NOTE — PCM.LDHP
L&D History of Present Illness





- General


Date of Service: 20


Admit Problem/Dx: 


                   Patient Status Order with Admit Dx/Problem





20 21:32


Patient Status [ADT] Routine 








                           Admission Diagnosis/Problem











Admission Diagnosis/Problem    Pregnancy














Source of Information: Patient


History Limitations: Reports: No Limitations





- Related Data


Allergies/Adverse Reactions: 


                                    Allergies











Allergy/AdvReac Type Severity Reaction Status Date / Time


 


No Known Allergies Allergy   Verified 19 09:01











Home Medications: 


                                    Home Meds





Ibuprofen 800 mg PO TID PRN 19 [History]











Past Medical History





- Past Health History


Medical/Surgical History: Denies Medical/Surgical History


HEENT History: Reports: Other (See Below)


Other HEENT History: wears glasses


Cardiovascular History: Reports: Other (See Below)


Other Cardiovascular History: HTN after child birth, "good now"


Respiratory History: Reports: None


Gastrointestinal History: Reports: None


Genitourinary History: Reports: None


OB/GYN History: Reports: Pregnancy


Other OB/BYN History: currently breast feeding,  in Dec, 2018


Musculoskeletal History: Reports: None


Neurological History: Reports: None


Psychiatric History: Reports: None


Endocrine/Metabolic History: Reports: None


Hematologic History: Reports: None


Immunologic History: Reports: None


Oncologic (Cancer) History: Reports: None


Dermatologic History: Reports: None





- Infectious Disease History


Infectious Disease History: Reports: None





- Past Surgical History


Head Surgeries/Procedures: Reports: None


HEENT Surgical History: Reports: None


Cardiovascular Surgical History: Reports: None


Respiratory Surgical History: Reports: None


GI Surgical History: Reports: None


Female  Surgical History: Reports: None


Endocrine Surgical History: Reports: None


Neurological Surgical History: Reports: None


Musculoskeletal Surgical History: Reports: None


Dermatological Surgical History: Reports: None





Social & Family History





- Family History


Family Medical History: Noncontributory





- Caffeine Use


Caffeine Use: Reports: None





H&P Review of Systems





- Review of Systems:


Review Of Systems: See Below


General: Reports: No Symptoms


HEENT: Reports: No Symptoms


Pulmonary: Reports: No Symptoms


Cardiovascular: Reports: No Symptoms


Gastrointestinal: Reports: No Symptoms


Genitourinary: Reports: No Symptoms


Musculoskeletal: Reports: No Symptoms


Skin: Reports: No Symptoms


Psychiatric: Reports: No Symptoms


Neurological: Reports: No Symptoms


Hematologic/Lymphatic: Reports: No Symptoms


Immunologic: Reports: No Symptoms





L&D Exam





- Exam


Exam: See Below





- Vital Signs


Weight: 209 lb





- OB Specific


Contraction Intensity: Mild to Moderate


Fetal Movement: Active


Fetal Heart Tones: Present


Fetal Heart Rate (FHR) Variability: Moderate (6-25 bmp)


Birth Presentation: Breech





- Garcia Score


Garcia Score Cervix Position: Midposition


Garcia Score Consistency: Soft


Garcia Score Effacement: >80%


Garcia Score Dilation: 3-4 cm


Garcia Score Infant's Station: -1 ,0


Garcia Score Total: 10





- Exam


General: Alert, Oriented, Cooperative


Lungs: Clear to Auscultation, Normal Respiratory Effort


Cardiovascular: Regular Rate, Regular Rhythm


GI/Abdominal Exam: Soft, Non-Tender


Rectal Exam: Deferred


Genitourinary: Normal external exam, Vaginal discharge (clear fluid)


Back Exam: Normal Inspection, Full Range of Motion


Extremities: Normal Inspection, Normal Range of Motion, Non-Tender, Normal 

Capillary Refill


Skin: Warm, Dry, Intact


Psychiatric: Alert, Normal Affect, Normal Mood





- Problem List


(1) Supervision of normal IUP (intrauterine pregnancy) in multigravida


SNOMED Code(s): 804125069, 958354655, [id number]


   ICD Code: Z34.80 - ENCOUNTER FOR SUPRVSN OF NORMAL PREGNANCY, UNSP TRIMESTER 

  Status: Acute   Priority: High   Current Visit: No   


Qualifiers: 


   Trimester: third trimester   Qualified Code(s): Z34.83 - Encounter for 

supervision of other normal pregnancy, third trimester   


Problem List Initiated/Reviewed/Updated: Yes


Orders Last 24hrs: 


                               Active Orders 24 hr











 Category Date Time Status


 


 Patient Status [ADT] Routine ADT  20 21:32 Active


 


 Bedrest Bathroom Privileges [RC] ASDIRECTED Care  20 21:45 Active


 


 Communication Order [RC] ASDIRECTED Care  20 21:45 Active


 


 Communication Order [RC] ASDIRECTED Care  20 21:45 Active


 


 Communication Order [RC] ASDIRECTED Care  20 21:45 Active


 


 Fetal Non Stress Test [RC] PER UNIT ROUTINE Care  20 21:32 Active


 


 Notify Provider [RC] PRN Care  20 21:45 Active


 


 Notify Provider [RC] PRN Care  20 21:45 Active


 


 Notify Provider [RC] STAT Care  20 21:45 Active


 


 Oxygen Therapy [RC] ASDIRECTED Care  20 21:45 Active


 


 Up ad Mary [RC] ASDIRECTED Care  20 21:32 Active


 


 Vaginal Exam [RC] Click to Edit Care  20 21:32 Active


 


 Vaginal Exam [RC] PRN Care  20 21:45 Active


 


 Vital Signs [RC] PER UNIT ROUTINE Care  20 21:32 Active


 


 Abdomen Ltd [US] Routine Exams  20 22:22 Ordered


 


 Butorphanol [Stadol] Med  20 21:45 Active





 1 mg IVPUSH Q1H PRN   


 


 Carboprost Tromethamine [Hemabate DS] Med  20 21:45 Active





 250 mcg IM ASDIRECTED PRN   


 


 Lactated Ringers [Ringers, Lactated] 1,000 ml Med  20 21:45 Active





 IV ASDIRECTED   


 


 Lidocaine 1% [Xylocaine 1%] Med  20 21:45 Active





 50 ml INJECT ONETIME PRN   


 


 Methylergonovine [Methergine] Med  20 21:45 Active





 0.2 mg IM ASDIRECTED PRN   


 


 Nalbuphine [Nubain] Med  20 21:45 Active





 10 mg IVPUSH Q1H PRN   


 


 Ondansetron [Zofran] Med  20 21:45 Active





 4 mg IVPUSH Q4H PRN   


 


 Oxytocin/0.9 % Sodium Chloride [Oxytocin 30 Unit/500 ML Med  20 21:45 

Active





 -NS]   





 30 unit in 500 ml IV TITRATE   


 


 Oxytocin/0.9 % Sodium Chloride [Oxytocin 30 Unit/500 ML Med  20 21:45 

Active





 -NS]   





 30 unit in 500 ml IV TITRATE   


 


 Sodium Chloride 0.9% [Normal Saline] Med  20 21:45 Active





 10 ml IV ASDIRECTED PRN   


 


 Terbutaline [Brethine] Med  20 21:45 Active





 0.25 mg SUBCUT ASDIRECTED PRN   


 


 Tranexamic Acid [Cyklokapron] 1,000 mg Med  20 21:45 Active





 Sodium Chloride 0.9% [Normal Saline] 100 ml   





 IV ONETIME   


 


 Water For Irrigation,Sterile [Sterile Water for Med  20 21:45 Active





 Irrigation]   





 1,000 ml IRR ASDIRECTED PRN   


 


 miSOPROStoL [Cytotec] Med  20 21:45 Active





 200 mcg PO ONETIME PRN   


 


 Medication Administration Instruction [OM.PC] Q3H Oth  20 21:45 Ordered


 


 Peripheral IV Insertion Adult [OM.PC] Routine Oth  20 21:45 Ordered


 


 Resuscitation Status Routine Resus Stat  20 21:32 Ordered








                                Medication Orders





Butorphanol Tartrate (Stadol)  1 mg IVPUSH Q1H PRN


   PRN Reason: Pain


Carboprost Tromethamine (Hemabate Ds)  250 mcg IM ASDIRECTED PRN


   PRN Reason: Post Partum Hemorrhage


Lactated Ringer's (Ringers, Lactated)  1,000 mls @ 150 mls/hr IV ASDIRECTED UZMA


   Last Admin: 20 22:11  Dose: 150 mls/hr


   Documented by: [email or web address]


Oxytocin/Sodium Chloride (Oxytocin 30 Unit/500 Ml-Ns)  30 unit in 500 mls @ 999 

mls/hr IV TITRATE UZMA


Tranexamic Acid 1,000 mg/ (Sodium Chloride)  110 mls @ 660 mls/hr IV ONETIME PRN


   PRN Reason: Bleeding


Oxytocin/Sodium Chloride (Oxytocin 30 Unit/500 Ml-Ns)  30 unit in 500 mls @ 2 

mls/hr IV TITRATE UZMA; Protocol


Lidocaine HCl (Xylocaine 1%)  50 ml INJECT ONETIME PRN


   PRN Reason: Laceration repair


Methylergonovine Maleate (Methergine)  0.2 mg IM ASDIRECTED PRN


   PRN Reason: Post Partum Hemorrhage


Misoprostol (Cytotec)  200 mcg PO ONETIME PRN


   PRN Reason: Post Partum Hemorrhage


Nalbuphine HCl (Nubain)  10 mg IVPUSH Q1H PRN


   PRN Reason: Pain (severe 7-10)


Ondansetron HCl (Zofran)  4 mg IVPUSH Q4H PRN


   PRN Reason: Nausea/Vomiting


Sodium Chloride (Normal Saline)  10 ml IV ASDIRECTED PRN


   PRN Reason: IV Use


Sterile Water (Sterile Water For Irrigation)  1,000 ml IRR ASDIRECTED PRN


   PRN Reason: delivery


Terbutaline Sulfate (Brethine)  0.25 mg SUBCUT ASDIRECTED PRN


   PRN Reason: Tacysystole








Assessment/Plan Comment:: 





Admit


A:  at 38 5/7 weeks (IVY: 20); presenting to L&D via ambulance with 

grossly ruptured membranes; 3 cm/80%/-1, soft, midposition per nurse report;  

A+, GBS negative, Rubella immune;  kemal q3-5 minutes; SVE by provider 

noted suspected breech position; bedside ultrasound confirmed breech 

presentation.


P: Admit for  section due to breech presentation with ruptured m

embranes; Dr. Akhtar and surgical team called in.

## 2020-06-20 RX ADMIN — KETOROLAC TROMETHAMINE SCH MG: 30 INJECTION, SOLUTION INTRAMUSCULAR at 06:38

## 2020-06-20 RX ADMIN — FENTANYL CITRATE PRN MCG: 50 INJECTION, SOLUTION INTRAMUSCULAR; INTRAVENOUS at 01:06

## 2020-06-20 RX ADMIN — KETOROLAC TROMETHAMINE SCH MG: 30 INJECTION, SOLUTION INTRAMUSCULAR at 12:50

## 2020-06-20 RX ADMIN — FENTANYL CITRATE PRN MCG: 50 INJECTION, SOLUTION INTRAMUSCULAR; INTRAVENOUS at 01:22

## 2020-06-20 RX ADMIN — KETOROLAC TROMETHAMINE SCH MG: 30 INJECTION, SOLUTION INTRAMUSCULAR at 18:38

## 2020-06-20 NOTE — US
INDICATION:



Evaluate fetal position for Caesarean section 



TECHNIQUE:



Ultrasound OB pelvis limited 



COMPARISON:



None



FINDINGS:



Sonographic imaging demonstrates a single living intrauterine gestation 

with fetal heart rate of 150 beats per minute and breech presentation. 

Significantly low amniotic fluid index at 1.30 centimeters. 



IMPRESSION:



Single intrauterine gestation with breech presentation and fetal heart rate 

of 150 beats per minute.



Oligohydramnios with amniotic fluid index 1.30 centimeters.



Dictated by Cyrus Wylie MD @ 6/20/2020 12:48:31 AM



Dictated by: Cyrus Wylie MD @ 06/20/2020 00:48:41



(Electronically Signed)

## 2020-06-20 NOTE — PCM.PREANE
Preanesthetic Assessment





- Anesthesia/Transfusion/Family Hx


Anesthesia History: No Prior Anesthesia


Family History of Anesthesia Reaction: No


Transfusion History: No Prior Transfusion(s)


Intubation History: Unknown





- Review of Systems


General: No Symptoms


Pulmonary: No Symptoms


Cardiovascular: No Symptoms


Gastrointestinal: No Symptoms


Neurological: No Symptoms


Other: Reports: None





- Physical Assessment


Height: 5 ft 6 in


Weight: 94.801 kg


ASA Class: 2E


Mental Status: Alert & Oriented x3


Airway Class: Mallampati = 2


Dentition: Reports: Normal Dentition


Thyro-Mental Finger Breadths: 3


Mouth Opening Finger Breadths: 3


ROM/Head Extension: Full


Lungs: Clear to Auscultation, Normal Respiratory Effort


Cardiovascular: Regular Rate, Regular Rhythm





- Lab


Values: 





                             Laboratory Last Values











WBC  8.33 K/uL (4.0-11.0)   20  22:06    


 


RBC  4.44 M/uL (4.30-5.90)   20  22:06    


 


Hgb  13.0 g/dL (12.0-16.0)   20  22:06    


 


Hct  38.6 % (36.0-46.0)   20  22:06    


 


MCV  86.9 fL (80.0-98.0)   20  22:06    


 


MCH  29.3 pg (27.0-32.0)   20  22:06    


 


MCHC  33.7 g/dL (31.0-37.0)   20  22:06    


 


RDW Std Deviation  49.7 fl (28.0-62.0)   20  22:06    


 


RDW Coeff of Guille  16 % (11.0-15.0)  H  20  22:06    


 


Plt Count  240 K/uL (150-400)   20  22:06    


 


MPV  11.30 fL (7.40-12.00)   20  22:06    


 


Nucleated RBC %  0.0 /100WBC  20  22:06    


 


Nucleated RBCs #  0 K/uL  20  22:06    


 


SARS-CoV-2 RNA (RT-PCR)  NEGATIVE  (NEGATIVE)   20  22:42    


 


Blood Type  A POSITIVE   20  22:06    


 


Antibody Screen  NEGATIVE   20  22:06    














- Allergies


Allergies/Adverse Reactions: 


                                    Allergies











Allergy/AdvReac Type Severity Reaction Status Date / Time


 


No Known Allergies Allergy   Verified 19 09:01














- Blood


Blood Available: No





- Anesthesia Plan


Pre-Op Medication Ordered: None





- Acknowledgements


Anesthesia Type Planned: Spinal (GETA back-up plan (rapid sequence))


Pt an Appropriate Candidate for the Planned Anesthesia: Yes


Alternatives and Risks of Anesthesia Discussed w Pt/Guardian: Yes


Pt/Guardian Understands and Agrees with Anesthesia Plan: Yes





PreAnesthesia Questionnaire





- Past Health History


Medical/Surgical History: Denies Medical/Surgical History


HEENT History: Reports: Other (See Below)


Other HEENT History: wears glasses


Cardiovascular History: Reports: Other (See Below)


Other Cardiovascular History: HTN after child birth, "good now"


Respiratory History: Reports: None


Gastrointestinal History: Reports: None


Genitourinary History: Reports: None


OB/GYN History: Reports: Pregnancy


Other OB/BYN History: currently breast feeding,  in Dec, 2018, full term 

pregnant, breech in labor


Musculoskeletal History: Reports: None


Neurological History: Reports: None


Psychiatric History: Reports: None


Endocrine/Metabolic History: Reports: None


Hematologic History: Reports: None


Immunologic History: Reports: None


Oncologic (Cancer) History: Reports: None


Dermatologic History: Reports: None





- Infectious Disease History


Infectious Disease History: Reports: None





- Past Surgical History


Head Surgeries/Procedures: Reports: None


HEENT Surgical History: Reports: None


Cardiovascular Surgical History: Reports: None


Respiratory Surgical History: Reports: None


GI Surgical History: Reports: None


Female  Surgical History: Reports: None


Endocrine Surgical History: Reports: None


Neurological Surgical History: Reports: None


Musculoskeletal Surgical History: Reports: None


Dermatological Surgical History: Reports: None





- HOME MEDS


Home Medications: 


                                    Home Meds





Ibuprofen 800 mg PO TID PRN 19 [History]











- CURRENT (IN HOUSE) MEDS


Current Meds: 





                               Current Medications





Butorphanol Tartrate (Stadol)  1 mg IVPUSH Q1H PRN


   PRN Reason: Pain


Carboprost Tromethamine (Hemabate Ds)  250 mcg IM ASDIRECTED PRN


   PRN Reason: Post Partum Hemorrhage


Lactated Ringer's (Ringers, Lactated)  1,000 mls @ 150 mls/hr IV ASDIRECTED UZMA


   Last Admin: 20 22:11 Dose:  150 mls/hr


   Documented by: 


Oxytocin/Sodium Chloride (Oxytocin 30 Unit/500 Ml-Ns)  30 unit in 500 mls @ 999 

mls/hr IV TITRATE UZMA


Tranexamic Acid 1,000 mg/ (Sodium Chloride)  110 mls @ 660 mls/hr IV ONETIME PRN


   PRN Reason: Bleeding


Oxytocin/Sodium Chloride (Oxytocin 30 Unit/500 Ml-Ns)  30 unit in 500 mls @ 2 

mls/hr IV TITRATE UZMA; Protocol


Lactated Ringer's (Ringers, Lactated)  1,000 mls @ 500 mls/hr IV BOLUS UZMA


Oxytocin/Sodium Chloride (Oxytocin 30 Unit/500 Ml-Ns)  30 unit in 500 mls @ 250 

mls/hr IV TITRATE UZMA


Lidocaine HCl (Xylocaine 1%)  50 ml INJECT ONETIME PRN


   PRN Reason: Laceration repair


Methylergonovine Maleate (Methergine)  0.2 mg IM ASDIRECTED PRN


   PRN Reason: Post Partum Hemorrhage


Misoprostol (Cytotec)  200 mcg PO ONETIME PRN


   PRN Reason: Post Partum Hemorrhage


Nalbuphine HCl (Nubain)  10 mg IVPUSH Q1H PRN


   PRN Reason: Pain (severe 7-10)


Ondansetron HCl (Zofran)  4 mg IVPUSH Q4H PRN


   PRN Reason: Nausea/Vomiting


Sodium Chloride (Normal Saline)  10 ml IV ASDIRECTED PRN


   PRN Reason: IV Use


Sodium Chloride (Saline Flush)  10 ml FLUSH ASDIRECTED PRN


   PRN Reason: Keep Vein Open


Sodium Chloride (Saline Flush)  2.5 ml FLUSH ASDIRECTED PRN


   PRN Reason: Keep Vein Open


Sodium Chloride (Normal Saline)  10 ml IV ASDIRECTED PRN


   PRN Reason: IV Use


Sterile Water (Sterile Water For Irrigation)  1,000 ml IRR ASDIRECTED PRN


   PRN Reason: delivery


Terbutaline Sulfate (Brethine)  0.25 mg SUBCUT ASDIRECTED PRN


   PRN Reason: Tacysystole





Discontinued Medications





Citric Acid/Sodium Citrate (Bicitra Solution)  30 ml PO ONETIME ONE


   Stop: 20 23:25


Fentanyl (Sublimaze) Confirm Administered Dose 250 mcg .ROUTE .STK-MED ONE


   Stop: 20 23:58


Ampicillin Sodium 1 gm/ Sodium (Chloride)  50 mls @ 100 mls/hr IV Q4H [facility]


Cefazolin Sodium/Dextrose 2 gm (/ Premix)  50 mls @ 100 mls/hr IV ONETIME ONE


   Stop: 20 23:53


Ketorolac Tromethamine (Toradol) Confirm Administered Dose 30 mg .ROUTE .STK-MED

 ONE


   Stop: 20 23:29


Morphine Sulfate (Duramorph Pf) Confirm Administered Dose 10 mg .ROUTE .STK-MED 

ONE


   Stop: 20 23:27


Ondansetron HCl (Zofran) Confirm Administered Dose 4 mg .ROUTE .STK-MED ONE


   Stop: 20 23:29


Oxytocin (Pitocin) Confirm Administered Dose 20 unit .ROUTE .STK-MED ONE


   Stop: 20 23:29


Phenylephrine HCl (Elbert-Synephrine) Confirm Administered Dose 10 mg .ROUTE .STK-

MED ONE


   Stop: 20 23:29


Propofol (Diprivan  20 Ml) Confirm Administered Dose 200 mg .ROUTE .STK-MED ONE


   Stop: 20 23:52

## 2020-06-20 NOTE — [id number]
Post Anesthesia Note





- EVALUATION WITHIN 48HRS OF ANESTHETIC


Vital Signs in Normal Range: Yes


Patient Participated in Evaluation: Yes


Respiratory Function Stable: Yes


Airway Patent: Yes


Cardiovascular Function Stable: Yes


Hydration Status Stable: Yes


Pain Control Satisfactory: Yes


Nausea and Vomiting Control Satisfactory: Yes


Mental Status Recovered: Yes


Vital Signs: 


                                Last Vital Signs











Temp  36.4 C   06/20/20 08:00


 


Pulse  78   06/20/20 08:00


 


Resp  13   06/20/20 08:00


 


BP  99/56 L  06/20/20 08:00


 


Pulse Ox  98   06/20/20 08:00

## 2020-06-20 NOTE — PCM.PNPP
- General Info


Date of Service: 20


Admission Dx/Problem (Free Text): 


                   Patient Status Order with Admit Dx/Problem





20 21:32


Patient Status [ADT] Routine 








                           Admission Diagnosis/Problem











Admission Diagnosis/Problem    Pregnancy














Functional Status: Reports: Pain Controlled, Tolerating Diet





- Review of Systems


General: Reports: No Symptoms


HEENT: Reports: No Symptoms


Pulmonary: Reports: No Symptoms


Cardiovascular: Reports: No Symptoms


Gastrointestinal: Reports: No Symptoms


Genitourinary: Reports: No Symptoms


Musculoskeletal: Reports: No Symptoms


Skin: Reports: No Symptoms


Neurological: Reports: No Symptoms


Psychiatric: Reports: No Symptoms





- General Info


Date of Service: 20





- Patient Data


Vital Signs - Most Recent: 


                                Last Vital Signs











Temp  97.5 F   20 08:00


 


Pulse  78   20 08:00


 


Resp  13   20 08:00


 


BP  99/56 L  20 08:00


 


Pulse Ox  98   20 08:00











Weight - Most Recent: 209 lb


I&O - Last 24 Hours: 


                                 Intake & Output











 20





 22:59 06:59 14:59


 


Output Total  225 


 


Balance  -225 











Lab Results - Last 24 Hours: 


                         Laboratory Results - last 24 hr











  20 Range/Units





  22:06 22:06 22:42 


 


WBC  8.33    (4.0-11.0)  K/uL


 


RBC  4.44    (4.30-5.90)  M/uL


 


Hgb  13.0    (12.0-16.0)  g/dL


 


Hct  38.6    (36.0-46.0)  %


 


MCV  86.9    (80.0-98.0)  fL


 


MCH  29.3    (27.0-32.0)  pg


 


MCHC  33.7    (31.0-37.0)  g/dL


 


RDW Std Deviation  49.7    (28.0-62.0)  fl


 


RDW Coeff of Guille  16 H    (11.0-15.0)  %


 


Plt Count  240    (150-400)  K/uL


 


MPV  11.30    (7.40-12.00)  fL


 


Nucleated RBC %  0.0    /100WBC


 


Nucleated RBCs #  0    K/uL


 


SARS-CoV-2 RNA (RT-PCR)    NEGATIVE  (NEGATIVE)  


 


Blood Type   A POSITIVE   


 


Antibody Screen   NEGATIVE   











Med Orders - Current: 


                               Current Medications





Bisacodyl (Dulcolax)  10 mg RECTAL ONETIME PRN


   PRN Reason: Constipation


Diphenhydramine HCl (Benadryl)  25 mg IVPUSH Q6H PRN


   PRN Reason: Itching or Nausea


Docusate Sodium (Colace)  100 mg PO BID [facility]


   Last Admin: 20 09:23 Dose:  100 mg


   Documented by: 


Emollient Ointment (Lansinoh Hpa)  0 gm TOP ASDIRECTED PRN


   PRN Reason: Sore Nipples


Tranexamic Acid 1,000 mg/ (Sodium Chloride)  110 mls @ 660 mls/hr IV ONETIME PRN


   PRN Reason: Bleeding


Lactated Ringer's (Ringers, Lactated)  1,000 mls @ 125 mls/hr IV ASDIRECTED [facility]


   Last Admin: 20 04:10 Dose:  125 mls/hr


   Documented by: 


Acetaminophen 1,000 mg/ Premix  100 mls @ 400 mls/hr IV Q6H PRN


   PRN Reason: Pain


   Last Admin: 20 01:07 Dose:  400 mls/hr


   Documented by: 


Ibuprofen (Motrin)  800 mg PO Q8H PRN


   PRN Reason: mild pain or fever


Ketorolac Tromethamine (Toradol)  30 mg IVPUSH Q6H [facility]


   Stop: 20 00:31


   Last Admin: 20 06:38 Dose:  30 mg


   Documented by: 


Methylergonovine Maleate (Methergine)  0.2 mg IM ONETIME PRN


   PRN Reason: Excessive Vaginal Bleeding


Misoprostol (Cytotec)  1,000 mcg RECTAL ONETIME PRN


   PRN Reason: excessive bleeding


Nalbuphine HCl (Nubain)  10 mg IVPUSH Q1H PRN


   PRN Reason: Pain (severe 7-10)


Ondansetron HCl (Zofran)  4 mg IVPUSH Q4H PRN


   PRN Reason: Nausea/Vomiting


Oxycodone/Acetaminophen (Percocet 325-5 Mg)  1 tab PO Q4H PRN


   PRN Reason: Pain (moderate 4-6)


Oxycodone/Acetaminophen (Percocet 325-5 Mg)  2 tab PO Q4H PRN


   PRN Reason: Pain (moderate 4-6)


Oxytocin (Pitocin)  10 unit IM ASDIRECTED PRN


   PRN Reason: Excessive Vaginal Bleeding





Discontinued Medications





Butorphanol Tartrate (Stadol)  1 mg IVPUSH Q1H PRN


   PRN Reason: Pain


Carboprost Tromethamine (Hemabate Ds)  250 mcg IM ASDIRECTED PRN


   PRN Reason: Post Partum Hemorrhage


Citric Acid/Sodium Citrate (Bicitra Solution)  30 ml PO ONETIME ONE


   Stop: 20 23:25


Diphenhydramine HCl (Benadryl)  25 mg IVPUSH Q4H PRN


   PRN Reason: Itching


   Stop: 20 00:18


Fentanyl (Sublimaze) Confirm Administered Dose 250 mcg .ROUTE .STK-MED ONE


   Stop: 20 23:58


Fentanyl (Sublimaze)  50 mcg IVPUSH Q1H PRN


   PRN Reason: Pain (severe 7-10)


Fentanyl (Sublimaze)  50 mcg IVPUSH Q5M PRN


   PRN Reason: Pain


   Last Admin: 20 01:22 Dose:  50 mcg


   Documented by: 


Lactated Ringer's (Ringers, Lactated)  1,000 mls @ 150 mls/hr IV ASDIRECTED [facility]


   Last Admin: 20 22:11 Dose:  150 mls/hr


   Documented by: 


Oxytocin/Sodium Chloride (Oxytocin 30 Unit/500 Ml-Ns)  30 unit in 500 mls @ 999 

mls/hr IV TITRATE [facility]


Tranexamic Acid 1,000 mg/ (Sodium Chloride)  110 mls @ 660 mls/hr IV ONETIME PRN


   PRN Reason: Bleeding


Oxytocin/Sodium Chloride (Oxytocin 30 Unit/500 Ml-Ns)  30 unit in 500 mls @ 2 

mls/hr IV TITRATE [facility]; Protocol


Ampicillin Sodium 1 gm/ Sodium (Chloride)  50 mls @ 100 mls/hr IV Q4H [facility]


Lactated Ringer's (Ringers, Lactated)  1,000 mls @ 500 mls/hr IV BOLUS [facility]


Oxytocin/Sodium Chloride (Oxytocin 30 Unit/500 Ml-Ns)  30 unit in 500 mls @ 250 

mls/hr IV TITRATE [facility]


Cefazolin Sodium/Dextrose 2 gm (/ Premix)  50 mls @ 100 mls/hr IV ONETIME ONE


   Stop: 20 23:53


Ketorolac Tromethamine (Toradol) Confirm Administered Dose 30 mg .ROUTE .STK-MED

 ONE


   Stop: 20 23:29


Lidocaine HCl (Xylocaine 1%)  50 ml INJECT ONETIME PRN


   PRN Reason: Laceration repair


Methylergonovine Maleate (Methergine)  0.2 mg IM ASDIRECTED PRN


   PRN Reason: Post Partum Hemorrhage


Misoprostol (Cytotec)  200 mcg PO ONETIME PRN


   PRN Reason: Post Partum Hemorrhage


Morphine Sulfate (Duramorph Pf) Confirm Administered Dose 10 mg .ROUTE .STK-MED 

ONE


   Stop: 20 23:27


Nalbuphine HCl (Nubain)  5 mg IVPUSH ASDIRECTED PRN


   PRN Reason: Itching


Naloxone HCl (Narcan)  0.1 mg IVPUSH ONETIME PRN


   PRN Reason: Respiratory Depression


   Stop: 20 00:18


Ondansetron HCl (Zofran)  4 mg IVPUSH Q4H PRN


   PRN Reason: Nausea/Vomiting


Ondansetron HCl (Zofran) Confirm Administered Dose 4 mg .ROUTE .STK-MED ONE


   Stop: 20 23:29


Ondansetron HCl (Zofran)  4 mg IVPUSH Q6H PRN


   PRN Reason: Nausea


Oxycodone/Acetaminophen (Percocet 325-5 Mg)  2 tab PO Q6H PRN


   PRN Reason: Pain (moderate 4-6)


Oxytocin (Pitocin) Confirm Administered Dose 20 unit .ROUTE .STK-MED ONE


   Stop: 20 23:29


Phenylephrine HCl (Elbert-Synephrine) Confirm Administered Dose 10 mg .ROUTE .STK-

MED ONE


   Stop: 20 23:29


Propofol (Diprivan  20 Ml) Confirm Administered Dose 200 mg .ROUTE .STK-MED ONE


   Stop: 20 23:52


Sodium Chloride (Normal Saline)  10 ml IV ASDIRECTED PRN


   PRN Reason: IV Use


Sodium Chloride (Saline Flush)  10 ml FLUSH ASDIRECTED PRN


   PRN Reason: Keep Vein Open


Sodium Chloride (Saline Flush)  2.5 ml FLUSH ASDIRECTED PRN


   PRN Reason: Keep Vein Open


Sodium Chloride (Normal Saline)  10 ml IV ASDIRECTED PRN


   PRN Reason: IV Use


Sterile Water (Sterile Water For Irrigation)  1,000 ml IRR ASDIRECTED PRN


   PRN Reason: delivery


Terbutaline Sulfate (Brethine)  0.25 mg SUBCUT ASDIRECTED PRN


   PRN Reason: Tacysystole











- Infant Interaction


Infant Disposition, Postpartum: [place] in Room with Family


Infant Feeding: Attempted Breastfeeding; Nursed Fair/Poor, Bottle Fed Infant, 

Encouraged to Breastfeed


Support Person: 





- Postpartum Recovery Exam


Fundal Tone: Firm


Fundal Level: 1 Fingerbreadths Below Umbilicus


Fundal Placement: Midline


Lochia Amount: Scant


Lochia Color: Rubra/Red


Perineum Description: Intact, Minimal Bruising/Swelling


Bladder Status: Indwelling Catheter in Place


Urinary Elimination: Indwelling Catheter





- Exam


General: Alert, Oriented, Cooperative, No Acute Distress


Lungs: Clear to Auscultation, Normal Respiratory Effort


Cardiovascular: Regular Rate, Regular Rhythm


GI/Abdominal Exam: Soft, Non-Tender


Extremities: Normal Inspection, Normal Range of Motion, Non-Tender, Normal 

Capillary Refill


Skin: Warm, Dry, Intact


Wound/Incisions: Dressing Dry and Intact


Neurological: No New Focal Deficit, Normal Speech


Psy/Mental Status: Alert, Normal Affect, Normal Mood





- Problem List & Annotations


(1) Supervision of normal IUP (intrauterine pregnancy) in multigravida


SNOMED Code(s): 641989053, 028232836, 437396131


   Code(s): Z34.80 - ENCOUNTER FOR SUPRVSN OF NORMAL PREGNANCY, UNSP TRIMESTER  

 Status: Acute   Priority: High   Current Visit: No   


Qualifiers: 


   Trimester: third trimester   Qualified Code(s): Z34.83 - Encounter for 

supervision of other normal pregnancy, third trimester   





- Problem List Review


Problem List Initiated/Reviewed/Updated: Yes





- Plan


Plan:: 





Admit


A:  at 38 5/7 weeks (IVY: 20); presenting to L&D via ambulance with 

grossly ruptured membranes; 3 cm/80%/-1, soft, midposition per nurse report;  

A+, GBS negative, Rubella immune;  kemal q3-5 minutes; SVE by provider 

noted suspected breech position; bedside ultrasound confirmed breech 

presentation.


P: Admit for  section due to breech presentation with ruptured 

membranes; Dr. Akhtar and surgical team called in.





Postpartum Day 1


A: Patient is resting in bed with infant and partner in the room with her.  

Indwelling catheter in place, SCDs applied.  Dressing dry and intact.  Reports 

adequate pain relief.  


P:  Routine postpartum engle of care; Dr. Akhtar updated.

## 2020-06-20 NOTE — PCM.OPNOTE
- General Post-Op/Procedure Note


Date of Surgery/Procedure: 06/20/20


Operative Procedure(s): Primary C/section


Pre Op Diagnosis: [id number] sara breech presentation.


Post-Op Diagnosis: Same


Anesthesia Technique: General ET Tube, Spinal


Primary Surgeon: Chip Akhtar


Assistant: Karin Tay


EBL in mLs: 800


Complications: None


Condition: Good

## 2020-06-21 VITALS — DIASTOLIC BLOOD PRESSURE: 74 MMHG | HEART RATE: 81 BPM | SYSTOLIC BLOOD PRESSURE: 118 MMHG

## 2020-06-21 RX ADMIN — OXYCODONE HYDROCHLORIDE AND ACETAMINOPHEN PRN TAB: 5; 325 TABLET ORAL at 08:10

## 2020-06-21 RX ADMIN — OXYCODONE HYDROCHLORIDE AND ACETAMINOPHEN PRN TAB: 5; 325 TABLET ORAL at 15:05

## 2020-06-21 RX ADMIN — KETOROLAC TROMETHAMINE SCH MG: 30 INJECTION, SOLUTION INTRAMUSCULAR at 00:41

## 2020-06-21 NOTE — PCM.DCSUM1
**Discharge Summary





- Hospital Course


Free Text/Narrative:: 





Discharge home with baby.  Follow up in the clinic in 1 week for incision check.

 Follow up in the clinic again in 6 weeks for routine postpartum visit. 


Diagnosis: Stroke: No


Modified Sola Scale: No Symptoms at All


Modified Riverview Scale Score: 0





- Discharge Data


Discharge Date: 06/21/20


Discharge Disposition: Home, Self-Care 01


Condition: Good





- Referral to Home Health


Primary Care Physician: 


PCP None








- Discharge Diagnosis/Problem(s)


(1) Supervision of normal IUP (intrauterine pregnancy) in multigravida


SNOMED Code(s): 066132324, 665504208, [id number]


   ICD Code: Z34.80 - ENCOUNTER FOR SUPRVSN OF NORMAL PREGNANCY, UNSP TRIMESTER 

 Status: Acute   Priority: High   Current Visit: No   


Qualifiers: 


   Trimester: third trimester   Qualified Code(s): Z34.83 - Encounter for 

supervision of other normal pregnancy, third trimester   





- Patient Summary/Data


Operative Procedure(s) Performed: Primary C/section





- Patient Instructions


Diet: Regular Diet as Tolerated, Drink 8-10+ Glasses/Day


Activity: As Tolerated, No Strenuous Activities, Rest and Relax Today


Driving: Do Not Drive


Showering/Bathing: May Shower


Wound/Incision Care: Keep Operative Site/Wound Site Clean and Dry


Notify Provider of: Fever, Increased Pain, Swelling and Redness, Drainage, 

Nausea and/or Vomiting





- Discharge Plan


*PRESCRIPTION DRUG MONITORING PROGRAM REVIEWED*: Not Applicable


*COPY OF PRESCRIPTION DRUG MONITORING REPORT IN PATIENT SADIA: Not Applicable


Prescriptions/Med Rec: 


Ibuprofen [Motrin] 800 mg PO Q8H PRN #90 tablet


 PRN Reason: mild pain or fever


Acetaminophen/oxyCODONE [Percocet 325-5 MG] 1 - 2 tab PO Q6HR PRN #24 tablet


 PRN Reason: Pain (Moderate 4-6)


Home Medications: 


                                    Home Meds





Ibuprofen 800 mg PO TID PRN 04/01/19 [History]


Acetaminophen/oxyCODONE [Percocet 325-5 MG] 1 - 2 tab PO Q6HR PRN #24 tablet 

06/21/20 [Rx]


Ibuprofen [Motrin] 800 mg PO Q8H PRN #90 tablet 06/21/20 [Rx]








Oxygen Therapy Mode: Room Air





- Discharge Summary/Plan Comment


DC Time >30 min.: Yes





- General Info


Date of Service: 06/21/20


Admission Dx/Problem (Free Text: 


                   Patient Status Order with Admit Dx/Problem





06/19/20 21:32


Patient Status [ADT] Routine 








                           Admission Diagnosis/Problem











Admission Diagnosis/Problem    Pregnancy














Functional Status: Reports: Pain Controlled, Tolerating Diet, Ambulating, 

Urinating





- Review of Systems


General: Reports: No Symptoms


HEENT: Reports: No Symptoms


Pulmonary: Reports: No Symptoms


Cardiovascular: Reports: No Symptoms


Gastrointestinal: Reports: No Symptoms


Genitourinary: Reports: No Symptoms


Musculoskeletal: Reports: No Symptoms


Skin: Reports: No Symptoms


Neurological: Reports: No Symptoms


Psychiatric: Reports: No Symptoms





- Patient Data


Vitals - Most Recent: 


                                Last Vital Signs











Temp  97.4 F   06/21/20 05:13


 


Pulse  85   06/21/20 05:13


 


Resp  15   06/21/20 05:13


 


BP  113/74   06/21/20 05:13


 


Pulse Ox  95   06/21/20 05:13











Weight - Most Recent: 209 lb


I&O - Last 24 hours: 


                                 Intake & Output











 06/20/20 06/21/20 06/21/20





 22:59 06:59 14:59


 


Output Total 605  


 


Balance -605  











Lab Results - Last 24 hrs: 


                         Laboratory Results - last 24 hr











  06/21/20 Range/Units





  06:02 


 


Hgb  10.3 L  (12.0-16.0)  g/dL


 


Hct  31.7 L  (36.0-46.0)  %











Med Orders - Current: 


                               Current Medications





Bisacodyl (Dulcolax)  10 mg RECTAL ONETIME PRN


   PRN Reason: Constipation


Diphenhydramine HCl (Benadryl)  25 mg IVPUSH Q6H PRN


   PRN Reason: Itching or Nausea


Docusate Sodium (Colace)  100 mg PO BID [facility]


   Last Admin: 06/21/20 08:10 Dose:  100 mg


   Documented by: 


Emollient Ointment (Lansinoh Hpa)  0 gm TOP ASDIRECTED PRN


   PRN Reason: Sore Nipples


Tranexamic Acid 1,000 mg/ (Sodium Chloride)  110 mls @ 660 mls/hr IV ONETIME PRN


   PRN Reason: Bleeding


Lactated Ringer's (Ringers, Lactated)  1,000 mls @ 125 mls/hr IV ASDIRECTED [facility]


   Last Admin: 06/20/20 12:02 Dose:  125 mls/hr


   Documented by: 


Acetaminophen 1,000 mg/ Premix  100 mls @ 400 mls/hr IV Q6H PRN


   PRN Reason: Pain


   Last Admin: 06/20/20 01:07 Dose:  400 mls/hr


   Documented by: 


Ibuprofen (Motrin)  800 mg PO Q8H PRN


   PRN Reason: mild pain or fever


   Last Admin: 06/21/20 08:10 Dose:  800 mg


   Documented by: 


Methylergonovine Maleate (Methergine)  0.2 mg IM ONETIME PRN


   PRN Reason: Excessive Vaginal Bleeding


Misoprostol (Cytotec)  1,000 mcg RECTAL ONETIME PRN


   PRN Reason: excessive bleeding


Nalbuphine HCl (Nubain)  10 mg IVPUSH Q1H PRN


   PRN Reason: Pain (severe 7-10)


Ondansetron HCl (Zofran)  4 mg IVPUSH Q4H PRN


   PRN Reason: Nausea/Vomiting


Oxycodone/Acetaminophen (Percocet 325-5 Mg)  1 tab PO Q4H PRN


   PRN Reason: Pain (moderate 4-6)


   Last Admin: 06/21/20 08:10 Dose:  1 tab


   Documented by: 


Oxycodone/Acetaminophen (Percocet 325-5 Mg)  2 tab PO Q4H PRN


   PRN Reason: Pain (moderate 4-6)


Oxytocin (Pitocin)  10 unit IM ASDIRECTED PRN


   PRN Reason: Excessive Vaginal Bleeding





Discontinued Medications





Butorphanol Tartrate (Stadol)  1 mg IVPUSH Q1H PRN


   PRN Reason: Pain


Carboprost Tromethamine (Hemabate Ds)  250 mcg IM ASDIRECTED PRN


   PRN Reason: Post Partum Hemorrhage


Citric Acid/Sodium Citrate (Bicitra Solution)  30 ml PO ONETIME ONE


   Stop: 06/19/20 23:25


Diphenhydramine HCl (Benadryl)  25 mg IVPUSH Q4H PRN


   PRN Reason: Itching


   Stop: 06/21/20 00:18


Fentanyl (Sublimaze) Confirm Administered Dose 250 mcg .ROUTE .STK-MED ONE


   Stop: 06/19/20 23:58


Fentanyl (Sublimaze)  50 mcg IVPUSH Q1H PRN


   PRN Reason: Pain (severe 7-10)


Fentanyl (Sublimaze)  50 mcg IVPUSH Q5M PRN


   PRN Reason: Pain


   Last Admin: 06/20/20 01:22 Dose:  50 mcg


   Documented by: 


Lactated Ringer's (Ringers, Lactated)  1,000 mls @ 150 mls/hr IV ASDIRECTED [facility]


   Last Admin: 06/19/20 22:11 Dose:  150 mls/hr


   Documented by: 


Oxytocin/Sodium Chloride (Oxytocin 30 Unit/500 Ml-Ns)  30 unit in 500 mls @ 999 

mls/hr IV TITRATE [facility]


Tranexamic Acid 1,000 mg/ (Sodium Chloride)  110 mls @ 660 mls/hr IV ONETIME PRN


   PRN Reason: Bleeding


Oxytocin/Sodium Chloride (Oxytocin 30 Unit/500 Ml-Ns)  30 unit in 500 mls @ 2 

mls/hr IV TITRATE [facility]; Protocol


Ampicillin Sodium 1 gm/ Sodium (Chloride)  50 mls @ 100 mls/hr IV Q4H [facility]


Lactated Ringer's (Ringers, Lactated)  1,000 mls @ 500 mls/hr IV BOLUS [facility]ZMA


Oxytocin/Sodium Chloride (Oxytocin 30 Unit/500 Ml-Ns)  30 unit in 500 mls @ 250 

mls/hr IV TITRATE [facility]


Cefazolin Sodium/Dextrose 2 gm (/ Premix)  50 mls @ 100 mls/hr IV ONETIME ONE


   Stop: 06/19/20 23:53


Ketorolac Tromethamine (Toradol) Confirm Administered Dose 30 mg .ROUTE .STK-MED

 ONE


   Stop: 06/19/20 23:29


Ketorolac Tromethamine (Toradol)  30 mg IVPUSH Q6H [facility]


   Stop: 06/21/20 00:31


   Last Admin: 06/21/20 00:41 Dose:  30 mg


   Documented by: 


Lidocaine HCl (Xylocaine 1%)  50 ml INJECT ONETIME PRN


   PRN Reason: Laceration repair


Methylergonovine Maleate (Methergine)  0.2 mg IM ASDIRECTED PRN


   PRN Reason: Post Partum Hemorrhage


Misoprostol (Cytotec)  200 mcg PO ONETIME PRN


   PRN Reason: Post Partum Hemorrhage


Morphine Sulfate (Duramorph Pf) Confirm Administered Dose 10 mg .ROUTE .STK-MED 

ONE


   Stop: 06/19/20 23:27


Nalbuphine HCl (Nubain)  5 mg IVPUSH ASDIRECTED PRN


   PRN Reason: Itching


Naloxone HCl (Narcan)  0.1 mg IVPUSH ONETIME PRN


   PRN Reason: Respiratory Depression


   Stop: 06/21/20 00:18


Ondansetron HCl (Zofran)  4 mg IVPUSH Q4H PRN


   PRN Reason: Nausea/Vomiting


Ondansetron HCl (Zofran) Confirm Administered Dose 4 mg .ROUTE .STK-MED ONE


   Stop: 06/19/20 23:29


Ondansetron HCl (Zofran)  4 mg IVPUSH Q6H PRN


   PRN Reason: Nausea


Oxycodone/Acetaminophen (Percocet 325-5 Mg)  2 tab PO Q6H PRN


   PRN Reason: Pain (moderate 4-6)


Oxytocin (Pitocin) Confirm Administered Dose 20 unit .ROUTE .STK-MED ONE


   Stop: 06/19/20 23:29


Phenylephrine HCl (Elbert-Synephrine) Confirm Administered Dose 10 mg .ROUTE .STK-

MED ONE


   Stop: 06/19/20 23:29


Propofol (Diprivan  20 Ml) Confirm Administered Dose 200 mg .ROUTE .STK-MED ONE


   Stop: 06/19/20 23:52


Sodium Chloride (Normal Saline)  10 ml IV ASDIRECTED PRN


   PRN Reason: IV Use


Sodium Chloride (Saline Flush)  10 ml FLUSH ASDIRECTED PRN


   PRN Reason: Keep Vein Open


Sodium Chloride (Saline Flush)  2.5 ml FLUSH ASDIRECTED PRN


   PRN Reason: Keep Vein Open


Sodium Chloride (Normal Saline)  10 ml IV ASDIRECTED PRN


   PRN Reason: IV Use


Sterile Water (Sterile Water For Irrigation)  1,000 ml IRR ASDIRECTED PRN


   PRN Reason: delivery


Terbutaline Sulfate (Brethine)  0.25 mg SUBCUT ASDIRECTED PRN


   PRN Reason: Tacysystole











- Exam


General: Reports: Alert, Oriented, Cooperative, No Acute Distress


Lungs: Reports: Clear to Auscultation, Normal Respiratory Effort


Cardiovascular: Reports: Regular Rate, Regular Rhythm


GI/Abdominal Exam: Soft, Non-Tender


 (Female) Exam: Deferred


Rectal (Female) Exam: Deferred


Back Exam: Reports: Normal Inspection, Full Range of Motion


Extremities: Normal Inspection, Normal Range of Motion, Non-Tender, Normal 

Capillary Refill


Skin: Reports: Warm, Dry, Intact


Wound/Incisions: Reports: Dressing Dry and Intact


Neurological: Reports: No New Focal Deficit, Normal Speech, Normal Tone, 

Strength Equal Bilateral, Sensation Intact


Psy/Mental Status: Reports: Alert, Normal Affect, Normal Mood

## 2020-06-22 NOTE — OR
SURGEON:

Chip Akhtar MD

 

DATE OF PROCEDURE:  2020

 

PREOPERATIVE DIAGNOSES:

Intrauterine pregnancy 38+ weeks, spontaneous rupture of membrane, sara breech

presentation confirmed by ultrasound.

 

POSTOPERATIVE DIAGNOSES:

Intrauterine pregnancy 38+ weeks, spontaneous rupture of membrane, sara breech

presentation confirmed by ultrasound.

 

OPERATION PERFORMED:

Primary low transverse  section.

 

PRIMARY SURGEON:

Chip Akhtar MD

 

ASSISTANT:

Karin Tay, certified nurse midwife.

 

ANESTHESIA:

Spinal failed and then general anesthesia by Mr. Jordon Skelton and Dr. Elizabeth.

 

ESTIMATED BLOOD LOSS:

800 mL.

 

COMPLICATIONS:

None.

 

FINDINGS:

Female fetus.  Apgar score and weight are not available at the time of the

dictation.

 

INDICATION FOR SURGERY:

This patient is 29.  She is para 3-0-0-3.  She is 38+ weeks.  She presented with

spontaneous rupture of membrane, active labor, and she was found to be in a

sara breech presentation.  This presentation is confirmed by an ultrasound and

decision made to do primary low transverse  section.

 

DESCRIPTION OF PROCEDURE:

The patient was brought to the OR, properly identified, and after spinal

anesthesia, the patient prepped and draped in sterile fashion as usual.

However, when we tested the spinal anesthesia, it was inadequate, and the

anesthesiologist and the nurse anesthetist proceeded to put the patient to

sleep.  Once the adequate anesthesia was established, then low transverse

Pfannenstiel skin incision done.  Agustin fascia and rectus fascia were opened in

direction of the incision.  The 2 recti muscles .  Peritoneal cavity

was entered and bladder flap was raised in the usual manner pushing the bladder

away from the lower uterine segment.  Low transverse uterine incision extended

manually with the hand.  Fetus was in sara breech presentation, delivered

without any problem, handed to the resuscitating team, who is headed by Dr. Cardoso, the pediatrician.  The Apgar scores and the weight are not available at

the time of the dictation.  The placenta delivered spontaneous, complete, and

intact, and repair of the lower uterine segment done with 2-0 Vicryl continuous

interlocking in 2 layers.  Reperitonealization done with 3-0 Vicryl continuous.

The peritoneal cavity evacuated completely from all blood and blood clot and

closed with 3-0 Vicryl continuous, and the rectus fascia was closed with #1 PDS

continuous.  Agustin fascia with 3-0 Vicryl continuous.  Skin closed with 3-0

Vicryl on a Toby needle in a subcuticular fashion and Dermabond.  Instrument

and sponge count was correct.  The patient tolerated the procedure well, went to

recovery room in stable general condition.

 

 

DANETTE LEE

DD:  2020 00:27:51

DT:  2020 01:37:36

Job #:  [id number]

## 2020-06-25 ENCOUNTER — HOSPITAL ENCOUNTER (EMERGENCY)
Dept: HOSPITAL 56 - MW.ED | Age: 30
Discharge: HOME | End: 2020-06-25
Payer: COMMERCIAL

## 2020-06-25 VITALS — DIASTOLIC BLOOD PRESSURE: 82 MMHG | HEART RATE: 65 BPM | SYSTOLIC BLOOD PRESSURE: 130 MMHG

## 2020-06-25 DIAGNOSIS — Z20.828: ICD-10-CM

## 2020-06-25 LAB
BUN SERPL-MCNC: 15 MG/DL (ref 7–18)
CHLORIDE SERPL-SCNC: 106 MMOL/L (ref 98–107)
CO2 SERPL-SCNC: 22.6 MMOL/L (ref 21–32)
GLUCOSE SERPL-MCNC: 74 MG/DL (ref 74–106)
POTASSIUM SERPL-SCNC: 3.9 MMOL/L (ref 3.5–5.1)
SODIUM SERPL-SCNC: 142 MMOL/L (ref 136–145)

## 2020-06-25 PROCEDURE — 87040 BLOOD CULTURE FOR BACTERIA: CPT

## 2020-06-25 PROCEDURE — 83605 ASSAY OF LACTIC ACID: CPT

## 2020-06-25 PROCEDURE — 36415 COLL VENOUS BLD VENIPUNCTURE: CPT

## 2020-06-25 PROCEDURE — 81001 URINALYSIS AUTO W/SCOPE: CPT

## 2020-06-25 PROCEDURE — 85025 COMPLETE CBC W/AUTO DIFF WBC: CPT

## 2020-06-25 PROCEDURE — 99284 EMERGENCY DEPT VISIT MOD MDM: CPT

## 2020-06-25 PROCEDURE — 87635 SARS-COV-2 COVID-19 AMP PRB: CPT

## 2020-06-25 PROCEDURE — U0002 COVID-19 LAB TEST NON-CDC: HCPCS

## 2020-06-25 PROCEDURE — 85610 PROTHROMBIN TIME: CPT

## 2020-06-25 PROCEDURE — 71045 X-RAY EXAM CHEST 1 VIEW: CPT

## 2020-06-25 PROCEDURE — 80053 COMPREHEN METABOLIC PANEL: CPT

## 2020-06-25 PROCEDURE — 87086 URINE CULTURE/COLONY COUNT: CPT

## 2020-06-25 NOTE — CR
Chest: Portable view of the chest was obtained.

 

Comparison: No prior chest imaging.

 

Heart size and mediastinum are within normal limits for portable 

technique.  Lungs are clear with no acute parenchymal change.  Slight 

scoliosis is noted within the spine.

 

Impression:

1.  Nothing acute is seen on portable chest x-ray.

 

Diagnostic code #2

 

This report was dictated in MDT

## 2020-06-25 NOTE — EDM.PDOC
ED HPI GENERAL MEDICAL PROBLEM





- General


Stated Complaint: [facility]


Time Seen by Provider: 20 11:41


Source of Information: Reports: Patient


History Limitations: Reports: No Limitations





- History of Present Illness


INITIAL COMMENTS - FREE TEXT/NARRATIVE: 


HISTORY AND PHYSICAL:





History of present illness:


Patient is a 29-year-old female who presents to the emergency room with complai

nts of lower extremity edema, headache, fever and concerns of hypertension. She 

is a , 4 para 4:  delivery on 2020 by Dr. Akhtar.  She has 

not had any  related concerns.  She did call to be seen at the women's 

clinic today as she had a temperature of 102 last evening.  She was evaluated on

an OB/GYN standpoint, her incision looks well and no concerns of any OB/GYN rela

bria illnesses or sequela of delivery.  She was recommended to come to the 

emergency room for evaluation as their concern was she needs to be screened for 

COVID and PE rule out.  Upon arrival to the emergency department her main 

concern is her blood pressure, generalized headache and lower ankle/foot 

swelling.  She reports she had preeclampsia in her previous pregnancy that did 

require hospitalization after delivery.  This last pregnancy she has not had any

problems with her blood pressure and has not taken any medications for 

hypertension.  Currently rates her pain at a 0-1 out of 10.  Took ibuprofen last

evening and Tylenol this morning at 8 AM.  Currently afebrile.  She describes 

her shortness of breath as mild and only when she is lying flat on her back.  

Denies any excessive vaginal bleeding or discharge. Patient denies any fever, 

chills, headache, change in vision, syncope or near syncope. Denies any chest 

pain, back pain, shortness of breath or cough. Denies any abdominal pain, n

ausea, vomiting, diarrhea, constipation or dysuria. Has not noted any blood in 

urine or stool. Patient has been eating and drinking appropriately.





Review of systems: 


As per history of present illness and below otherwise all systems reviewed and 

negative.





Past medical history: 


As per history of present illness and as reviewed below otherwise 

noncontributory.





Surgical history: 


As per history of present illness and as reviewed below otherwise 

noncontributory.





Social history: 


See social history for further information





Family history: 


As per history of present illness and as reviewed below otherwise 

noncontributory.





Physical exam:


General: Well-developed and well-nourished 29-year-old black female.  Alert and 

oriented.  Nontoxic-appearing and in no acute distress.  Vital signs are stable 

and have been reviewed by me.


HEENT: Atraumatic, normocephalic, pupils equal and reactive bilaterally, 

negative for conjunctival pallor or scleral icterus, mucous membranes moist, TMs

normal bilaterally, throat clear, neck supple, nontender, trachea midline. No 

drooling or trismus noted. No meningeal signs. No hot potato voice noted. 


Lungs: Clear to auscultation, breath sounds equal bilaterally, chest nontender.


Heart: S1S2, regular rate and rhythm without overt murmur


Abdomen: Soft, nondistended, nontender. Negative for masses or 

hepatosplenomegaly. Negative for costovertebral tenderness.


Pelvis: Pfannenstiel incision to the low abdomen stable nontender.


Skin: Intact, warm, dry. No lesions or rashes noted.


Extremities: Atraumatic, moves all extremities per self without difficulty or 

deficits, negative for cords or calf pain. Neurovascular unremarkable.


Neuro: Awake, alert, oriented. Cranial nerves II through XII unremarkable. 

Cerebellum unremarkable. Motor and sensory unremarkable throughout. Exam 

nonfocal.





Notes:


Initial BP is slightly elevated; will continue to monitor.  Patient is currently

afebrile and is not tachycardic.  Lung sounds are clear and my physical exam is 

within normal limits.  We will do basic lab work at this time.





Lab work is unremarkable, with the exception of UTI.  Blood pressure has been 

within normal range.  I did call and talk with Agnes Morrison, provider that 

sent her to the emergency department, we reviewed her diagnostics.  Patient will

be discharged to home with supportive care measures.  The women's health clinic 

has already called the patient to set up a follow-up appointment for Monday.  We

discussed signs and symptoms that would prompt her to return to the emergency 

department. Supportive care measures were reviewed and discussed. Voices 

understanding and is agreeable to plan of care. Denies any further questions or 

concerns at this time.





Diagnostics:


CBC, CMP, INR, Lactate, BC x 2, COVID,





Therapeutics:


IV fluids





Prescription:


None





Impression: 


Encounter for medical screening exam


UTI





Plan:


1.  Continue with your postpartum care as you have been directed by Agnes Morrison.  There nursing staff should have called you with a follow-up 

appointment date and time for next week.  If at any time your symptoms should 

return, worsen or new symptoms develop please return to the emergency room -we 

are more than happy to reevaluate you.


2.  Make sure you are drinking plenty of fluids.  You can alternate Tylenol and 

ibuprofen as needed for mild to moderate pain.  You should have your oxycodone 

still available if needed for moderate to severe pain.  This medication may 

cause drowsiness so do not take it while driving or needing to be functioning 

outside of the house.





Definitive disposition and diagnosis as appropriate pending reevaluation and 

review of above.








- Related Data


                                    Allergies











Allergy/AdvReac Type Severity Reaction Status Date / Time


 


No Known Allergies Allergy   Verified 20 11:45











Home Meds: 


                                    Home Meds





Ibuprofen [Motrin] 800 mg PO Q8H PRN #90 tablet 20 [Rx]











Past Medical History





- Past Health History


Medical/Surgical History: Denies Medical/Surgical History


HEENT History: Reports: Other (See Below)


Other HEENT History: wears glasses


Cardiovascular History: Reports: Other (See Below)


Other Cardiovascular History: HTN after child birth, "good now"


Respiratory History: Reports: None


Gastrointestinal History: Reports: None


Genitourinary History: Reports: None


OB/GYN History: Reports: Pregnancy


Other OB/GYN History: currently breast feeding,  in Dec, 2018, full term 

pregnant, breech in labor


Musculoskeletal History: Reports: None


Neurological History: Reports: None


Psychiatric History: Reports: None


Endocrine/Metabolic History: Reports: None


Hematologic History: Reports: None


Immunologic History: Reports: None


Oncologic (Cancer) History: Reports: None


Dermatologic History: Reports: None





- Infectious Disease History


Infectious Disease History: Reports: None





- Past Surgical History


Head Surgeries/Procedures: Reports: None


HEENT Surgical History: Reports: None


Cardiovascular Surgical History: Reports: None


Respiratory Surgical History: Reports: None


GI Surgical History: Reports: None


Female  Surgical History: Reports: None


Endocrine Surgical History: Reports: None


Neurological Surgical History: Reports: None


Musculoskeletal Surgical History: Reports: None


Dermatological Surgical History: Reports: None





Social & Family History





- Family History


Family Medical History: Noncontributory





- Caffeine Use


Caffeine Use: Reports: None





ED ROS GENERAL





- Review of Systems


Review Of Systems: Comprehensive ROS is negative, except as noted in HPI.





ED EXAM, GENERAL





- Physical Exam


Exam: See Below (See dictation)





Course





- Vital Signs


Last Recorded V/S: 


                                Last Vital Signs











Temp  97.5 F   20 11:45


 


Pulse  65   20 13:41


 


Resp  14   20 13:41


 


BP  130/82   20 13:41


 


Pulse Ox  95   20 13:41














- Orders/Labs/Meds


Orders: 


                               Active Orders 24 hr











 Category Date Time Status


 


 CULTURE BLOOD [BC] Stat Lab  20 12:05 Received


 


 CULTURE BLOOD [BC] Stat Lab  20 12:10 Received


 


 CULTURE URINE [RM] Stat Lab  20 13:00 Received


 


 Nitrofurantoin Mono/Macrocryst [Macrobid] Med  20 13:44 Once





 100 mg PO ONETIME ONE   


 


 Sodium Chloride 0.9% [Saline Flush] Med  20 11:42 Active





 10 ml FLUSH ASDIRECTED PRN   


 


 Sodium Chloride 0.9% [Saline Flush] Med  20 11:42 Active





 2.5 ml FLUSH ASDIRECTED PRN   


 


 Blood Culture x2 Reflex Set [OM.PC] Stat Oth  20 11:42 Ordered


 


 Saline Lock Insert [OM.PC] Stat Oth  20 11:42 Ordered








                                Medication Orders





Sodium Chloride (Saline Flush)  10 ml FLUSH ASDIRECTED PRN


   PRN Reason: Keep Vein Open


   Last Admin: 20 12:00  Dose: 10 ml


   Documented by: ARPIT


Sodium Chloride (Saline Flush)  2.5 ml FLUSH ASDIRECTED PRN


   PRN Reason: Keep Vein Open


   Last Admin: 20 11:59  Dose: 2.5 ml


   Documented by: ARPIT








Labs: 


                                Laboratory Tests











  20 Range/Units





  11:45 11:45 11:45 


 


WBC  8.04    (4.0-11.0)  K/uL


 


RBC  3.83 L    (4.30-5.90)  M/uL


 


Hgb  10.8 L    (12.0-16.0)  g/dL


 


Hct  33.5 L    (36.0-46.0)  %


 


MCV  87.5    (80.0-98.0)  fL


 


MCH  28.2    (27.0-32.0)  pg


 


MCHC  32.2    (31.0-37.0)  g/dL


 


RDW Std Deviation  48.5    (28.0-62.0)  fl


 


RDW Coeff of Guille  15    (11.0-15.0)  %


 


Plt Count  272    (150-400)  K/uL


 


MPV  10.60    (7.40-12.00)  fL


 


Neut % (Auto)  53.9    (48.0-80.0)  %


 


Lymph % (Auto)  33.8    (16.0-40.0)  %


 


Mono % (Auto)  9.6    (0.0-15.0)  %


 


Eos % (Auto)  2.5    (0.0-7.0)  %


 


Baso % (Auto)  0.2    (0.0-1.5)  %


 


Neut # (Auto)  4.3    (1.4-5.7)  K/uL


 


Lymph # (Auto)  2.7 H    (0.6-2.4)  K/uL


 


Mono # (Auto)  0.8    (0.0-0.8)  K/uL


 


Eos # (Auto)  0.2    (0.0-0.7)  K/uL


 


Baso # (Auto)  0.0    (0.0-0.1)  K/uL


 


Nucleated RBC %  0.5    /100WBC


 


Nucleated RBCs #  0    K/uL


 


INR     


 


Lactate   1.0   (0.20-2.00)  mmol/L


 


Sodium    142  (136-145)  mmol/L


 


Potassium    3.9  (3.5-5.1)  mmol/L


 


Chloride    106  ()  mmol/L


 


Carbon Dioxide    22.6  (21.0-32.0)  mmol/L


 


BUN    15  (7.0-18.0)  mg/dL


 


Creatinine    0.9  (0.6-1.0)  mg/dL


 


Est Cr Clr Drug Dosing    81.50  mL/min


 


Estimated GFR (MDRD)    > 60.0  ml/min


 


Glucose    74  ()  mg/dL


 


Calcium    8.5  (8.5-10.1)  mg/dL


 


Total Bilirubin    0.4  (0.2-1.0)  mg/dL


 


AST    47 H  (15-37)  IU/L


 


ALT    72 H  (14-63)  IU/L


 


Alkaline Phosphatase    162 H  ()  U/L


 


Total Protein    6.3 L  (6.4-8.2)  g/dL


 


Albumin    2.4 L  (3.4-5.0)  g/dL


 


Globulin    3.9  (2.6-4.0)  g/dL


 


Albumin/Globulin Ratio    0.6 L  (0.9-1.6)  


 


Urine Color     


 


Urine Appearance     


 


Urine pH     (5.0-8.0)  


 


Ur Specific Gravity     (1.001-1.035)  


 


Urine Protein     (NEGATIVE)  mg/dL


 


Urine Glucose (UA)     (NEGATIVE)  mg/dL


 


Urine Ketones     (NEGATIVE)  mg/dL


 


Urine Occult Blood     (NEGATIVE)  


 


Urine Nitrite     (NEGATIVE)  


 


Urine Bilirubin     (NEGATIVE)  


 


Urine Urobilinogen     (<2.0)  EU/dL


 


Ur Leukocyte Esterase     (NEGATIVE)  


 


Urine RBC     (0-2/HPF)  


 


Urine WBC     (0-5/HPF)  


 


Ur Epithelial Cells     (NONE-FEW)  


 


Urine Bacteria     (NEGATIVE)  


 


SARS-CoV-2 RNA (RT-PCR)     (NEGATIVE)  














  20 Range/Units





  11:45 11:45 13:00 


 


WBC     (4.0-11.0)  K/uL


 


RBC     (4.30-5.90)  M/uL


 


Hgb     (12.0-16.0)  g/dL


 


Hct     (36.0-46.0)  %


 


MCV     (80.0-98.0)  fL


 


MCH     (27.0-32.0)  pg


 


MCHC     (31.0-37.0)  g/dL


 


RDW Std Deviation     (28.0-62.0)  fl


 


RDW Coeff of Guille     (11.0-15.0)  %


 


Plt Count     (150-400)  K/uL


 


MPV     (7.40-12.00)  fL


 


Neut % (Auto)     (48.0-80.0)  %


 


Lymph % (Auto)     (16.0-40.0)  %


 


Mono % (Auto)     (0.0-15.0)  %


 


Eos % (Auto)     (0.0-7.0)  %


 


Baso % (Auto)     (0.0-1.5)  %


 


Neut # (Auto)     (1.4-5.7)  K/uL


 


Lymph # (Auto)     (0.6-2.4)  K/uL


 


Mono # (Auto)     (0.0-0.8)  K/uL


 


Eos # (Auto)     (0.0-0.7)  K/uL


 


Baso # (Auto)     (0.0-0.1)  K/uL


 


Nucleated RBC %     /100WBC


 


Nucleated RBCs #     K/uL


 


INR  0.91    


 


Lactate     (0.20-2.00)  mmol/L


 


Sodium     (136-145)  mmol/L


 


Potassium     (3.5-5.1)  mmol/L


 


Chloride     ()  mmol/L


 


Carbon Dioxide     (21.0-32.0)  mmol/L


 


BUN     (7.0-18.0)  mg/dL


 


Creatinine     (0.6-1.0)  mg/dL


 


Est Cr Clr Drug Dosing     mL/min


 


Estimated GFR (MDRD)     ml/min


 


Glucose     ()  mg/dL


 


Calcium     (8.5-10.1)  mg/dL


 


Total Bilirubin     (0.2-1.0)  mg/dL


 


AST     (15-37)  IU/L


 


ALT     (14-63)  IU/L


 


Alkaline Phosphatase     ()  U/L


 


Total Protein     (6.4-8.2)  g/dL


 


Albumin     (3.4-5.0)  g/dL


 


Globulin     (2.6-4.0)  g/dL


 


Albumin/Globulin Ratio     (0.9-1.6)  


 


Urine Color    YELLOW  


 


Urine Appearance    SLT CLOUDY  


 


Urine pH    6.5  (5.0-8.0)  


 


Ur Specific Gravity    1.015  (1.001-1.035)  


 


Urine Protein    NEGATIVE  (NEGATIVE)  mg/dL


 


Urine Glucose (UA)    NEGATIVE  (NEGATIVE)  mg/dL


 


Urine Ketones    NEGATIVE  (NEGATIVE)  mg/dL


 


Urine Occult Blood    LARGE H  (NEGATIVE)  


 


Urine Nitrite    NEGATIVE  (NEGATIVE)  


 


Urine Bilirubin    NEGATIVE  (NEGATIVE)  


 


Urine Urobilinogen    0.2  (<2.0)  EU/dL


 


Ur Leukocyte Esterase    SMALL H  (NEGATIVE)  


 


Urine RBC    0-3  (0-2/HPF)  


 


Urine WBC    3-5  (0-5/HPF)  


 


Ur Epithelial Cells    MODERATE  (NONE-FEW)  


 


Urine Bacteria    1+ H  (NEGATIVE)  


 


SARS-CoV-2 RNA (RT-PCR)   NEGATIVE   (NEGATIVE)  











Meds: 


Medications











Generic Name Dose Route Start Last Admin





  Trade Name Freq  PRN Reason Stop Dose Admin


 


Sodium Chloride  10 ml  20 11:42  20 12:00





  Saline Flush  FLUSH   10 ml





  ASDIRECTED PRN   Administration





  Keep Vein Open  


 


Sodium Chloride  2.5 ml  20 11:42  20 11:59





  Saline Flush  FLUSH   2.5 ml





  ASDIRECTED PRN   Administration





  Keep Vein Open  














Discontinued Medications














Generic Name Dose Route Start Last Admin





  Trade Name Freq  PRN Reason Stop Dose Admin


 


Sodium Chloride  1,000 mls @ 999 mls/hr  20 11:42  20 11:58





  Normal Saline  IV  20 12:42  999 mls/hr





  STAT ONE   Administration














Departure





- Departure


Time of Disposition: 13:45


Disposition: Home, Self-Care 01


Clinical Impression: 


 Encounter for medical screening examination





UTI (urinary tract infection) following delivery


Qualifiers:


  urinary tract infection type: bladder infection Qualified Code(s): 

O86.22 - Infection of bladder following delivery








- Discharge Information


Instructions:  Postpartum Care After  Delivery, Urinary Tract Infection,

Adult


Referrals: 


Agnes Morrison CNM [Primary Care Provider] - 


Additional Instructions: 


The following information is given to patients seen in the emergency department 

who are being discharged to home. This information is to outline your options 

for follow-up care. We provide all patients seen in our emergency department 

with a follow-up referral.





The need for follow-up, as well as the timing and circumstances, are variable 

depending upon the specifics of your emergency department visit.





If you don't have a primary care physician on staff, we will provide you with a 

referral. We always advise you to contact your personal physician following an 

emergency department visit to inform them of the circumstance of the visit and 

for follow-up with them and/or the need for any referrals to a consulting 

specialist.





The emergency department will also refer you to a specialist when appropriate. 

This referral assures that you have the opportunity for follow-up care with a 

specialist. All of these measure are taken in an effort to provide you with 

optimal care, which includes your follow-up.





Under all circumstances we always encourage you to contact your private 

physician who remains a resource for coordinating your care. When calling for 

follow-up care, please make the office aware that this follow-up is from your 

recent emergency room visit. If for any reason you are refused follow-up, please

contact the [facility] Emergency Department

at [phone] and asked to speak to the emergency department charge nurse.





[facility]


Primary Care


[address] [place] [id number]


Phone: [phone]


Fax: [phone]





[facility]


[address] [place] [id number]


Phone: [phone]


Fax: [phone]





1.  Continue with your postpartum care as you have been directed by Agnes Morrison.  There nursing staff should have called you with a follow-up 

appointment date and time for next week.  If at any time your symptoms should 

return, worsen or new symptoms develop please return to the emergency room -we 

are more than happy to reevaluate you.


2.  Make sure you are drinking plenty of fluids.  You can alternate Tylenol and 

ibuprofen as needed for mild to moderate pain.  You should have your oxycodone 

still available if needed for moderate to severe pain.  This medication may 

cause drowsiness so do not take it while driving or needing to be functioning 

outside of the house.





Sepsis Event Note (ED)





- Focused Exam


Vital Signs: 


                                   Vital Signs











  Temp Pulse Resp BP Pulse Ox


 


 20 13:41   65  14  130/82  95


 


 20 12:46   69  15  131/83  96


 


 20 11:45  97.5 F  75  18  153/82 H  98














- My Orders


Last 24 Hours: 


My Active Orders





20 11:42


Sodium Chloride 0.9% [Saline Flush]   10 ml FLUSH ASDIRECTED PRN 


Sodium Chloride 0.9% [Saline Flush]   2.5 ml FLUSH ASDIRECTED PRN 


Blood Culture x2 Reflex Set [OM.PC] Stat 


Saline Lock Insert [OM.PC] Stat 





20 12:05


CULTURE BLOOD [BC] Stat 





20 12:10


CULTURE BLOOD [BC] Stat 





20 13:00


CULTURE URINE [RM] Stat 





20 13:44


Nitrofurantoin Mono/Macrocryst [Macrobid]   100 mg PO ONETIME ONE 














- Assessment/Plan


Last 24 Hours: 


My Active Orders





20 11:42


Sodium Chloride 0.9% [Saline Flush]   10 ml FLUSH ASDIRECTED PRN 


Sodium Chloride 0.9% [Saline Flush]   2.5 ml FLUSH ASDIRECTED PRN 


Blood Culture x2 Reflex Set [OM.PC] Stat 


Saline Lock Insert [OM.PC] Stat 





20 12:05


CULTURE BLOOD [BC] Stat 





20 12:10


CULTURE BLOOD [BC] Stat 





20 13:00


CULTURE URINE [RM] Stat 





20 13:44


Nitrofurantoin Mono/Macrocryst [Macrobid]   100 mg PO ONETIME ONE